# Patient Record
Sex: FEMALE | Race: OTHER | HISPANIC OR LATINO
[De-identification: names, ages, dates, MRNs, and addresses within clinical notes are randomized per-mention and may not be internally consistent; named-entity substitution may affect disease eponyms.]

---

## 2018-12-01 ENCOUNTER — TRANSCRIPTION ENCOUNTER (OUTPATIENT)
Age: 58
End: 2018-12-01

## 2020-01-30 ENCOUNTER — EMERGENCY (EMERGENCY)
Facility: HOSPITAL | Age: 60
LOS: 1 days | Discharge: HOME | End: 2020-01-30
Attending: EMERGENCY MEDICINE | Admitting: EMERGENCY MEDICINE
Payer: COMMERCIAL

## 2020-01-30 VITALS
HEART RATE: 88 BPM | RESPIRATION RATE: 15 BRPM | OXYGEN SATURATION: 99 % | TEMPERATURE: 98 F | SYSTOLIC BLOOD PRESSURE: 171 MMHG | DIASTOLIC BLOOD PRESSURE: 91 MMHG

## 2020-01-30 VITALS
SYSTOLIC BLOOD PRESSURE: 160 MMHG | OXYGEN SATURATION: 100 % | DIASTOLIC BLOOD PRESSURE: 110 MMHG | TEMPERATURE: 98 F | HEIGHT: 65 IN | RESPIRATION RATE: 18 BRPM | WEIGHT: 223.11 LBS | HEART RATE: 108 BPM

## 2020-01-30 PROCEDURE — 99283 EMERGENCY DEPT VISIT LOW MDM: CPT

## 2020-01-30 PROCEDURE — 99282 EMERGENCY DEPT VISIT SF MDM: CPT | Mod: 25

## 2020-01-30 NOTE — ED PROVIDER NOTE - OBJECTIVE STATEMENT
pt with epistaxis from L nares that resolved prior to arrival with manual pressure.  pt went to urgent care a few days ago with same sx and was prescribe affrin and a nasal cream which pt has not needed since epistaxis had resolved.  pt reports no h/o HTN but BP is high in healthcare setting usually.

## 2020-01-30 NOTE — ED PROVIDER NOTE - PATIENT PORTAL LINK FT
You can access the FollowMyHealth Patient Portal offered by [facility] by registering at the following website: [email or web address]. By joining [facility]’s FollowMyHealth portal, you will also be able to view your health information using other applications (apps) compatible with our system.

## 2020-01-30 NOTE — ED PROVIDER NOTE - NSFOLLOWUPINSTRUCTIONS_ED_ALL_ED_FT
-- apply vaseline to b/l nares twice a day, use humidifier at night.  follow up with ENT (below) if bleeding recurs.    -- Return to the ER for worsening or persistent symptoms, and/or ANY NEW OR CONCERNING SYMPTOMS.    -- If you have difficulty following up, please call: [phone] to obtain a [facility] doctor or specialist who takes your insurance in your area.

## 2020-01-30 NOTE — ED PROVIDER NOTE - PMH
Amputation right 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/t injury in 2000    Anxiety    GERD (Gastroesophageal Reflux Disease)    History of Cholecystectomy 2010    History of Tonsillectomy    Knee Injury s/p arthroscopy x 4 and surgical repair from 2003 - 2008

## 2020-01-30 NOTE — ED PROVIDER NOTE - CARE PROVIDER_API CALL
Gerson Guadarrama)  Otolaryngology  [address], [address]  [place] [address]  Phone: [phone]  Fax: [phone]  Follow Up Time:

## 2020-01-30 NOTE — ED PROVIDER NOTE - ENMT, MLM
Airway patent, b/l nares with dried out membranes,  L nares with some dried blood and area along the septum with denuded skin which is likely source of bleeding.

## 2021-01-24 ENCOUNTER — TRANSCRIPTION ENCOUNTER (OUTPATIENT)
Age: 61
End: 2021-01-24

## 2021-04-13 ENCOUNTER — APPOINTMENT (OUTPATIENT)
Dept: OTOLARYNGOLOGY | Facility: CLINIC | Age: 61
End: 2021-04-13
Payer: COMMERCIAL

## 2021-04-13 VITALS
HEIGHT: 65 IN | WEIGHT: 208 LBS | BODY MASS INDEX: 34.66 KG/M2 | HEART RATE: 102 BPM | DIASTOLIC BLOOD PRESSURE: 76 MMHG | SYSTOLIC BLOOD PRESSURE: 113 MMHG | TEMPERATURE: 97.6 F

## 2021-04-13 DIAGNOSIS — H91.90 UNSPECIFIED HEARING LOSS, UNSPECIFIED EAR: ICD-10-CM

## 2021-04-13 PROCEDURE — 99072 ADDL SUPL MATRL&STAF TM PHE: CPT

## 2021-04-13 PROCEDURE — 92567 TYMPANOMETRY: CPT

## 2021-04-13 PROCEDURE — 99203 OFFICE O/P NEW LOW 30 MIN: CPT

## 2021-04-13 PROCEDURE — 92557 COMPREHENSIVE HEARING TEST: CPT

## 2021-04-13 NOTE — REVIEW OF SYSTEMS
[Sneezing] : sneezing [Seasonal Allergies] : seasonal allergies [Post Nasal Drip] : post nasal drip [Eyes Itch] : itching of the eyes [Anxiety] : anxiety [Easy Bruising] : a tendency for easy bruising [Negative] : Endocrine [Patient Intake Form Reviewed] : Patient intake form was reviewed [FreeTextEntry3] : watery eyes  [FreeTextEntry1] : fatigue

## 2023-12-11 ENCOUNTER — APPOINTMENT (OUTPATIENT)
Dept: ORTHOPEDIC SURGERY | Facility: CLINIC | Age: 63
End: 2023-12-11
Payer: COMMERCIAL

## 2023-12-11 VITALS — BODY MASS INDEX: 33.29 KG/M2 | WEIGHT: 195 LBS | HEIGHT: 64 IN

## 2023-12-11 DIAGNOSIS — M25.562 PAIN IN LEFT KNEE: ICD-10-CM

## 2023-12-11 DIAGNOSIS — M16.52 UNILATERAL POST-TRAUMATIC OSTEOARTHRITIS, LEFT HIP: ICD-10-CM

## 2023-12-11 DIAGNOSIS — E11.9 TYPE 2 DIABETES MELLITUS W/OUT COMPLICATIONS: ICD-10-CM

## 2023-12-11 PROCEDURE — 99204 OFFICE O/P NEW MOD 45 MIN: CPT

## 2023-12-11 PROCEDURE — 73502 X-RAY EXAM HIP UNI 2-3 VIEWS: CPT

## 2023-12-11 PROCEDURE — 73564 X-RAY EXAM KNEE 4 OR MORE: CPT | Mod: LT

## 2023-12-11 RX ORDER — METFORMIN HYDROCHLORIDE 1000 MG/1
1000 TABLET, COATED ORAL
Refills: 0 | Status: ACTIVE | COMMUNITY

## 2023-12-11 RX ORDER — LISINOPRIL 30 MG/1
TABLET ORAL
Refills: 0 | Status: ACTIVE | COMMUNITY

## 2023-12-11 RX ORDER — EMPAGLIFLOZIN 10 MG/1
10 TABLET, FILM COATED ORAL
Refills: 0 | Status: ACTIVE | COMMUNITY

## 2023-12-21 ENCOUNTER — APPOINTMENT (OUTPATIENT)
Dept: ORTHOPEDIC SURGERY | Facility: HOSPITAL | Age: 63
End: 2023-12-21

## 2024-01-22 ENCOUNTER — APPOINTMENT (OUTPATIENT)
Dept: ORTHOPEDIC SURGERY | Facility: CLINIC | Age: 64
End: 2024-01-22

## 2024-01-24 ENCOUNTER — APPOINTMENT (OUTPATIENT)
Dept: ORTHOPEDIC SURGERY | Facility: CLINIC | Age: 64
End: 2024-01-24

## 2024-02-13 ENCOUNTER — OUTPATIENT (OUTPATIENT)
Dept: OUTPATIENT SERVICES | Facility: HOSPITAL | Age: 64
LOS: 1 days | End: 2024-02-13
Payer: COMMERCIAL

## 2024-02-13 VITALS
OXYGEN SATURATION: 99 % | WEIGHT: 194.01 LBS | RESPIRATION RATE: 16 BRPM | HEART RATE: 111 BPM | TEMPERATURE: 98 F | SYSTOLIC BLOOD PRESSURE: 129 MMHG | DIASTOLIC BLOOD PRESSURE: 73 MMHG | HEIGHT: 63 IN

## 2024-02-13 DIAGNOSIS — Z90.49 ACQUIRED ABSENCE OF OTHER SPECIFIED PARTS OF DIGESTIVE TRACT: Chronic | ICD-10-CM

## 2024-02-13 DIAGNOSIS — Z98.890 OTHER SPECIFIED POSTPROCEDURAL STATES: Chronic | ICD-10-CM

## 2024-02-13 DIAGNOSIS — M16.12 UNILATERAL PRIMARY OSTEOARTHRITIS, LEFT HIP: ICD-10-CM

## 2024-02-13 DIAGNOSIS — Z01.818 ENCOUNTER FOR OTHER PREPROCEDURAL EXAMINATION: ICD-10-CM

## 2024-02-13 DIAGNOSIS — E11.9 TYPE 2 DIABETES MELLITUS WITHOUT COMPLICATIONS: ICD-10-CM

## 2024-02-13 DIAGNOSIS — M16.11 UNILATERAL PRIMARY OSTEOARTHRITIS, RIGHT HIP: ICD-10-CM

## 2024-02-13 LAB
A1C WITH ESTIMATED AVERAGE GLUCOSE RESULT: 9 % — HIGH (ref 4–5.6)
ALBUMIN SERPL ELPH-MCNC: 4.1 G/DL — SIGNIFICANT CHANGE UP (ref 3.3–5)
ALP SERPL-CCNC: 116 U/L — SIGNIFICANT CHANGE UP (ref 40–120)
ALT FLD-CCNC: 25 U/L — SIGNIFICANT CHANGE UP (ref 12–78)
ANION GAP SERPL CALC-SCNC: 6 MMOL/L — SIGNIFICANT CHANGE UP (ref 5–17)
APTT BLD: 31.7 SEC — SIGNIFICANT CHANGE UP (ref 24.5–35.6)
AST SERPL-CCNC: 12 U/L — LOW (ref 15–37)
BILIRUB SERPL-MCNC: 0.3 MG/DL — SIGNIFICANT CHANGE UP (ref 0.2–1.2)
BUN SERPL-MCNC: 26 MG/DL — HIGH (ref 7–23)
CALCIUM SERPL-MCNC: 10.3 MG/DL — HIGH (ref 8.5–10.1)
CHLORIDE SERPL-SCNC: 108 MMOL/L — SIGNIFICANT CHANGE UP (ref 96–108)
CO2 SERPL-SCNC: 26 MMOL/L — SIGNIFICANT CHANGE UP (ref 22–31)
CREAT SERPL-MCNC: 0.95 MG/DL — SIGNIFICANT CHANGE UP (ref 0.5–1.3)
EGFR: 68 ML/MIN/1.73M2 — SIGNIFICANT CHANGE UP
ESTIMATED AVERAGE GLUCOSE: 212 MG/DL — HIGH (ref 68–114)
GLUCOSE SERPL-MCNC: 258 MG/DL — HIGH (ref 70–99)
HCT VFR BLD CALC: 47.5 % — HIGH (ref 34.5–45)
HGB BLD-MCNC: 15.8 G/DL — HIGH (ref 11.5–15.5)
INR BLD: 0.83 RATIO — LOW (ref 0.85–1.18)
MCHC RBC-ENTMCNC: 31.9 PG — SIGNIFICANT CHANGE UP (ref 27–34)
MCHC RBC-ENTMCNC: 33.3 GM/DL — SIGNIFICANT CHANGE UP (ref 32–36)
MCV RBC AUTO: 96 FL — SIGNIFICANT CHANGE UP (ref 80–100)
MRSA PCR RESULT.: SIGNIFICANT CHANGE UP
NRBC # BLD: 0 /100 WBCS — SIGNIFICANT CHANGE UP (ref 0–0)
PLATELET # BLD AUTO: 414 K/UL — HIGH (ref 150–400)
POTASSIUM SERPL-MCNC: 4.4 MMOL/L — SIGNIFICANT CHANGE UP (ref 3.5–5.3)
POTASSIUM SERPL-SCNC: 4.4 MMOL/L — SIGNIFICANT CHANGE UP (ref 3.5–5.3)
PROT SERPL-MCNC: 7.5 G/DL — SIGNIFICANT CHANGE UP (ref 6–8.3)
PROTHROM AB SERPL-ACNC: 9.8 SEC — SIGNIFICANT CHANGE UP (ref 9.5–13)
RBC # BLD: 4.95 M/UL — SIGNIFICANT CHANGE UP (ref 3.8–5.2)
RBC # FLD: 13.6 % — SIGNIFICANT CHANGE UP (ref 10.3–14.5)
S AUREUS DNA NOSE QL NAA+PROBE: DETECTED
SODIUM SERPL-SCNC: 140 MMOL/L — SIGNIFICANT CHANGE UP (ref 135–145)
WBC # BLD: 8.44 K/UL — SIGNIFICANT CHANGE UP (ref 3.8–10.5)
WBC # FLD AUTO: 8.44 K/UL — SIGNIFICANT CHANGE UP (ref 3.8–10.5)

## 2024-02-13 PROCEDURE — 85730 THROMBOPLASTIN TIME PARTIAL: CPT

## 2024-02-13 PROCEDURE — 87640 STAPH A DNA AMP PROBE: CPT

## 2024-02-13 PROCEDURE — 86850 RBC ANTIBODY SCREEN: CPT

## 2024-02-13 PROCEDURE — 93010 ELECTROCARDIOGRAM REPORT: CPT

## 2024-02-13 PROCEDURE — 86901 BLOOD TYPING SEROLOGIC RH(D): CPT

## 2024-02-13 PROCEDURE — 85610 PROTHROMBIN TIME: CPT

## 2024-02-13 PROCEDURE — 36415 COLL VENOUS BLD VENIPUNCTURE: CPT

## 2024-02-13 PROCEDURE — 86900 BLOOD TYPING SEROLOGIC ABO: CPT

## 2024-02-13 PROCEDURE — 87641 MR-STAPH DNA AMP PROBE: CPT

## 2024-02-13 PROCEDURE — 83036 HEMOGLOBIN GLYCOSYLATED A1C: CPT

## 2024-02-13 PROCEDURE — G0463: CPT

## 2024-02-13 PROCEDURE — 73502 X-RAY EXAM HIP UNI 2-3 VIEWS: CPT | Mod: 26,LT

## 2024-02-13 PROCEDURE — 85027 COMPLETE CBC AUTOMATED: CPT

## 2024-02-13 PROCEDURE — 73502 X-RAY EXAM HIP UNI 2-3 VIEWS: CPT

## 2024-02-13 PROCEDURE — 93005 ELECTROCARDIOGRAM TRACING: CPT

## 2024-02-13 PROCEDURE — 80053 COMPREHEN METABOLIC PANEL: CPT

## 2024-02-13 NOTE — H&P PST ADULT - GENERAL COMMENTS
s/p COVID Dec 2023 no s/s; Fully vaccinated; Denies recent international travel, recent illness and sick contact with COVID in the last 3 weeks

## 2024-02-13 NOTE — H&P PST ADULT - PROBLEM SELECTOR PLAN 3
A1c pending. Patient advised that (s)he would need endo clearance if A1c came back 9 or above. Hold Metformin for 24 hours, last dose 2/24. Hold Jardiance, last dose 2/22. Hold Trulicity, last dose 2/15. Hold all diabetic meds DOS. Verbalized understanding. Fingerstick am of surgery. -A1c pending.   -Last A1c- 9.0  -Advised that she will need Endocrine consult if A1c comes  back 9 or higher.   -Take insulin as directed by endocrine.  -Reduce long acting insulin by 20% the night before the surgery. Take 16 Units of Tresiba  -No  metformin for 24 hrs. before surgery. Last dose on 4/13/24.  -Stop Ozempic,1 week before the procedure due to the risk of aspiration. Last dose 4/4/24  -Stop Jardiance, 3 days before surgery. Last dose 4/11/24  -Stop Glimiperide after Last dose 4/14/24 morning  -Stop Humalog after evening dose on 4/14/24. No dose on day of surgery.  -Fingerstick on admission.   -Hypoglycemia protocol  - Verbalized understanding of all instructions

## 2024-02-13 NOTE — H&P PST ADULT - PROBLEM SELECTOR PLAN 1
scheduled for left total hip replacement on 2/26 with Dr. Benitez She is scheduled for Left THR with Dr. Benitez on 04/15/2024. She is scheduled for Left THR with Dr. Benitez on 02/26/2024.

## 2024-02-13 NOTE — H&P PST ADULT - HISTORY OF PRESENT ILLNESS
64 yo female presents to PST scheduled for left total hip replacement on 2/26 with Dr. Benitez  62 yo female with Anxiety, HTN, HLD and T2DM, presents to PST scheduled for left total hip replacement on 2/26 with Dr. Benitez. H/O bone on bone OA of the hip. C/O pain, rated her pain  9/10 with ambulation.  This is a 63 yr. old female with the PMH significant for anxiety, Hypertension, high cholesterol and [id number], presenting today to [facility] with a diagnosis of OA. She had the surgery scheduled initially in february but had to postpone due to elevated A1C and since then had readjustments of her diabetic meds.  She is scheduled for Left THR with Dr. Benitez on 04/15/2024.      Reports refractive joint pain x few years, had xrays showing bone on bone,  and after consulting with the surgeon agreed to have the above mentioned procedure.     Denies any numbness or tingling to the extremities at this time.     This is a 63 yr. old female with the PMH significant for anxiety, Hypertension, high cholesterol and [id number], presenting today to [facility] with a diagnosis of OA. She had the surgery scheduled initially in february but had to postpone due to elevated A1C and since then had readjustments of her diabetic meds.  She is scheduled for Left THR with Dr. Benitez on 02/26/2024.      Reports refractive joint pain x few years, had xrays showing bone on bone,  and after consulting with the surgeon agreed to have the above mentioned procedure.     Denies any numbness or tingling to the extremities at this time.     This is a 63 yr. old female with the PMH significant for anxiety, Hypertension, high cholesterol and [id number], presenting today to [facility] with a diagnosis of OA. She is scheduled for Left THR with Dr. Benitez on 02/26/2024.      Reports refractive joint pain x few years, had xrays showing bone on bone,  and after consulting with the surgeon agreed to have the above mentioned procedure.     Denies any numbness or tingling to the extremities at this time.

## 2024-02-13 NOTE — H&P PST ADULT - SOURCE OF INFORMATION, PROFILE
Anesthesia Post-op Note    Patient: Yu Kelley  Procedure(s) Performed: TOTAL LAPAROSCOPIC HYSTERECTOMY, BILATERAL SALPINGECTOMYLAPAROSCOPIC BILATERAL OVARIAN CYSTECTOMY - BILATERALCYSTOSCOPY  Anesthesia type: General    Vitals Value Taken Time   Temp 36.1 °C (97 °F) 08/01/23 1237   Pulse 82 08/01/23 1237   Resp 16 08/01/23 1237   SpO2 100 % 08/01/23 1237   /75 08/01/23 1237         Patient Location: PACU Phase 1  Post-op Vital Signs:stable  Level of Consciousness: awake, alert, participates in exam and oriented  Respiratory Status: spontaneous ventilation  Cardiovascular blood pressure returned to baseline and stable  Hydration: euvolemic  Pain Management: well controlled  Handoff: Handoff to receiving clinician was performed and questions were answered  Vomiting: none  Nausea: None  Airway Patency:patent  Post-op Assessment: awake, alert, appropriately conversant, or baseline, no complications, patient tolerated procedure well, no evidence of recall, regional anesthetic in place - able to participate, dentition within defined limits, moving all extremities and No Corneal Abrasion      No notable events documented.   patient patient/chart(s)

## 2024-02-13 NOTE — H&P PST ADULT - NSICDXPASTSURGICALHX_GEN_ALL_CORE_FT
PAST SURGICAL HISTORY:  H/O arthroscopy of knee     H/O colonoscopy     History of appendectomy     History of cholecystectomy

## 2024-02-13 NOTE — H&P PST ADULT - PROBLEM SELECTOR PLAN 5
Received positive nose culture for MSSA. Rx for mupirocin ointment sent to pt's pharmacy. Spoke with pt and reinforced how to use medication as directed. Pt verbalized understanding. - Prescription sent. Instructions provided

## 2024-02-13 NOTE — H&P PST ADULT - PROBLEM SELECTOR PLAN 2
Labs-CBC, CMP, PT/PTT, T&S, A1c, Nose cx, and EKG, hip X-ray     Pre op and Hibiclens instructions reviewed and given. Take routine am meds DOS with sip of water. Avoid/Hold NSAIDs and OTC supplements. Tylenol is ok for pain or headache.  Verbalized understanding Labs-CBC, CMP, PT/PTT, T&S, A1c, Nose cx, and EKG, hip X-ray  MC with Dr. Sánchez on 2/16  Pre op and Hibiclens instructions reviewed and given. Take routine am Lisinopril, DOS with sip of water. Avoid/Hold NSAIDs and OTC supplements. Tylenol is ok for pain or headache.  Verbalized understanding Labs-CBC, CMP, T&S, A1c, Nose cx, hip X-ray  MC with Dr. Sánchez on 4/9/24.  Pre op and Hibiclens instructions reviewed and given. Take am Xanax prn DOS with sip of water.   Avoid/Hold NSAIDs and OTC supplements. T  ylenol is ok for pain or headache.    Verbalized understanding Labs-CBC, CMP, T&S, A1c, Nose cx, hip X-ray  MC with Dr. Sánchez.  Pre op and Hibiclens instructions reviewed and given. Take am Xanax prn DOS with sip of water.   Avoid/Hold NSAIDs and OTC supplements. T  Tylenol is ok for pain or headache.    Verbalized understanding

## 2024-02-13 NOTE — H&P PST ADULT - ASSESSMENT
scheduled for left total hip replacement on 2/26 with Dr. Benitez    This is a 63 yr. old female with the PMH significant for anxiety, Hypertension, high cholesterol and [id number], presenting today to [facility] with a diagnosis of OA. She is scheduled for Left THR with Dr. Benitez on 04/15/2024.

## 2024-02-13 NOTE — H&P PST ADULT - MUSCULOSKELETAL
Dr. Jamison please advise on message below.     Last office visit 10/29/21.    decreased ROM/decreased ROM due to pain/decreased strength/abnormal gait details…

## 2024-02-13 NOTE — H&P PST ADULT - NSICDXPASTMEDICALHX_GEN_ALL_CORE_FT
PAST MEDICAL HISTORY:  Amputation right 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/t injury in 2000     Anxiety     GERD (Gastroesophageal Reflux Disease)     History of Cholecystectomy 2010     History of Tonsillectomy     Knee Injury s/p arthroscopy x 4 and surgical repair from 2003 - 2008      PAST MEDICAL HISTORY:  Amputation right 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/t injury in 2000     Anxiety     GERD (Gastroesophageal Reflux Disease)     History of Cholecystectomy 2010     History of Tonsillectomy     Knee Injury s/p arthroscopy x 4 and surgical repair from 2003 - 2008     Unilateral primary osteoarthritis, left hip      PAST MEDICAL HISTORY:  Amputation right 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/t injury in 2000     Anxiety     GERD (Gastroesophageal Reflux Disease)     History of Cholecystectomy 2010     History of Tonsillectomy     Knee Injury s/p arthroscopy x 4 and surgical repair from 2003 - 2008     Positive culture findings in nose     Unilateral primary osteoarthritis, left hip      PAST MEDICAL HISTORY:  Amputation right 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/t injury in 2000     Anxiety     Elevated hemoglobin A1c     GERD (Gastroesophageal Reflux Disease)     History of Cholecystectomy 2010     History of Tonsillectomy     Knee Injury s/p arthroscopy x 4 and surgical repair from 2003 - 2008     MSSA (methicillin susceptible Staphylococcus aureus) infection     Positive culture findings in nose     Unilateral primary osteoarthritis, left hip

## 2024-02-14 DIAGNOSIS — R73.09 OTHER ABNORMAL GLUCOSE: ICD-10-CM

## 2024-02-14 DIAGNOSIS — R84.5 ABNORMAL MICROBIOLOGICAL FINDINGS IN SPECIMENS FROM RESPIRATORY ORGANS AND THORAX: ICD-10-CM

## 2024-02-14 RX ORDER — MUPIROCIN 20 MG/G
1 OINTMENT TOPICAL
Qty: 1 | Refills: 0
Start: 2024-02-14 | End: 2024-02-18

## 2024-04-05 PROBLEM — M16.12 UNILATERAL PRIMARY OSTEOARTHRITIS, LEFT HIP: Chronic | Status: ACTIVE | Noted: 2024-02-13

## 2024-04-05 PROBLEM — R73.09 OTHER ABNORMAL GLUCOSE: Chronic | Status: ACTIVE | Noted: 2024-02-14

## 2024-04-05 PROBLEM — R84.5: Chronic | Status: ACTIVE | Noted: 2024-02-14

## 2024-04-05 PROBLEM — A49.01 METHICILLIN SUSCEPTIBLE STAPHYLOCOCCUS AUREUS INFECTION, UNSPECIFIED SITE: Chronic | Status: ACTIVE | Noted: 2024-02-14

## 2024-04-08 ENCOUNTER — OUTPATIENT (OUTPATIENT)
Dept: OUTPATIENT SERVICES | Facility: HOSPITAL | Age: 64
LOS: 1 days | End: 2024-04-08
Payer: COMMERCIAL

## 2024-04-08 VITALS
SYSTOLIC BLOOD PRESSURE: 147 MMHG | RESPIRATION RATE: 18 BRPM | WEIGHT: 203.05 LBS | TEMPERATURE: 98 F | OXYGEN SATURATION: 99 % | HEIGHT: 65 IN | DIASTOLIC BLOOD PRESSURE: 86 MMHG | HEART RATE: 95 BPM

## 2024-04-08 DIAGNOSIS — Z01.818 ENCOUNTER FOR OTHER PREPROCEDURAL EXAMINATION: ICD-10-CM

## 2024-04-08 DIAGNOSIS — Z98.890 OTHER SPECIFIED POSTPROCEDURAL STATES: Chronic | ICD-10-CM

## 2024-04-08 DIAGNOSIS — M16.12 UNILATERAL PRIMARY OSTEOARTHRITIS, LEFT HIP: ICD-10-CM

## 2024-04-08 DIAGNOSIS — Z90.49 ACQUIRED ABSENCE OF OTHER SPECIFIED PARTS OF DIGESTIVE TRACT: Chronic | ICD-10-CM

## 2024-04-08 DIAGNOSIS — M16.11 UNILATERAL PRIMARY OSTEOARTHRITIS, RIGHT HIP: ICD-10-CM

## 2024-04-08 DIAGNOSIS — Z90.89 ACQUIRED ABSENCE OF OTHER ORGANS: Chronic | ICD-10-CM

## 2024-04-08 DIAGNOSIS — E11.9 TYPE 2 DIABETES MELLITUS WITHOUT COMPLICATIONS: ICD-10-CM

## 2024-04-08 LAB
A1C WITH ESTIMATED AVERAGE GLUCOSE RESULT: 8 % — HIGH (ref 4–5.6)
ALBUMIN SERPL ELPH-MCNC: 4 G/DL — SIGNIFICANT CHANGE UP (ref 3.3–5)
ALP SERPL-CCNC: 125 U/L — HIGH (ref 40–120)
ALT FLD-CCNC: 30 U/L — SIGNIFICANT CHANGE UP (ref 12–78)
ANION GAP SERPL CALC-SCNC: 7 MMOL/L — SIGNIFICANT CHANGE UP (ref 5–17)
AST SERPL-CCNC: 17 U/L — SIGNIFICANT CHANGE UP (ref 15–37)
BILIRUB SERPL-MCNC: 0.4 MG/DL — SIGNIFICANT CHANGE UP (ref 0.2–1.2)
BUN SERPL-MCNC: 23 MG/DL — SIGNIFICANT CHANGE UP (ref 7–23)
CALCIUM SERPL-MCNC: 10 MG/DL — SIGNIFICANT CHANGE UP (ref 8.5–10.1)
CHLORIDE SERPL-SCNC: 109 MMOL/L — HIGH (ref 96–108)
CO2 SERPL-SCNC: 28 MMOL/L — SIGNIFICANT CHANGE UP (ref 22–31)
CREAT SERPL-MCNC: 0.8 MG/DL — SIGNIFICANT CHANGE UP (ref 0.5–1.3)
EGFR: 84 ML/MIN/1.73M2 — SIGNIFICANT CHANGE UP
ESTIMATED AVERAGE GLUCOSE: 183 MG/DL — HIGH (ref 68–114)
GLUCOSE SERPL-MCNC: 194 MG/DL — HIGH (ref 70–99)
HCT VFR BLD CALC: 49.3 % — HIGH (ref 34.5–45)
HGB BLD-MCNC: 15.8 G/DL — HIGH (ref 11.5–15.5)
MCHC RBC-ENTMCNC: 31.3 PG — SIGNIFICANT CHANGE UP (ref 27–34)
MCHC RBC-ENTMCNC: 32 GM/DL — SIGNIFICANT CHANGE UP (ref 32–36)
MCV RBC AUTO: 97.6 FL — SIGNIFICANT CHANGE UP (ref 80–100)
MRSA PCR RESULT.: SIGNIFICANT CHANGE UP
NRBC # BLD: 0 /100 WBCS — SIGNIFICANT CHANGE UP (ref 0–0)
PLATELET # BLD AUTO: 352 K/UL — SIGNIFICANT CHANGE UP (ref 150–400)
POTASSIUM SERPL-MCNC: 4.4 MMOL/L — SIGNIFICANT CHANGE UP (ref 3.5–5.3)
POTASSIUM SERPL-SCNC: 4.4 MMOL/L — SIGNIFICANT CHANGE UP (ref 3.5–5.3)
PROT SERPL-MCNC: 7.6 G/DL — SIGNIFICANT CHANGE UP (ref 6–8.3)
RBC # BLD: 5.05 M/UL — SIGNIFICANT CHANGE UP (ref 3.8–5.2)
RBC # FLD: 13.5 % — SIGNIFICANT CHANGE UP (ref 10.3–14.5)
S AUREUS DNA NOSE QL NAA+PROBE: DETECTED
SODIUM SERPL-SCNC: 144 MMOL/L — SIGNIFICANT CHANGE UP (ref 135–145)
WBC # BLD: 6.92 K/UL — SIGNIFICANT CHANGE UP (ref 3.8–10.5)
WBC # FLD AUTO: 6.92 K/UL — SIGNIFICANT CHANGE UP (ref 3.8–10.5)

## 2024-04-08 PROCEDURE — G0463: CPT

## 2024-04-08 PROCEDURE — 73502 X-RAY EXAM HIP UNI 2-3 VIEWS: CPT

## 2024-04-08 PROCEDURE — 87640 STAPH A DNA AMP PROBE: CPT

## 2024-04-08 PROCEDURE — 73502 X-RAY EXAM HIP UNI 2-3 VIEWS: CPT | Mod: 26,LT

## 2024-04-08 PROCEDURE — 36415 COLL VENOUS BLD VENIPUNCTURE: CPT

## 2024-04-08 PROCEDURE — 86850 RBC ANTIBODY SCREEN: CPT

## 2024-04-08 PROCEDURE — 86900 BLOOD TYPING SEROLOGIC ABO: CPT

## 2024-04-08 PROCEDURE — 86901 BLOOD TYPING SEROLOGIC RH(D): CPT

## 2024-04-08 PROCEDURE — 83036 HEMOGLOBIN GLYCOSYLATED A1C: CPT

## 2024-04-08 PROCEDURE — 80053 COMPREHEN METABOLIC PANEL: CPT

## 2024-04-08 PROCEDURE — 85027 COMPLETE CBC AUTOMATED: CPT

## 2024-04-08 PROCEDURE — 87641 MR-STAPH DNA AMP PROBE: CPT

## 2024-04-08 RX ORDER — LISINOPRIL 2.5 MG/1
1 TABLET ORAL
Refills: 0 | DISCHARGE

## 2024-04-08 RX ORDER — DEXTROSE 50 % IN WATER 50 %
15 SYRINGE (ML) INTRAVENOUS ONCE
Refills: 0 | Status: DISCONTINUED | OUTPATIENT
Start: 2024-04-15 | End: 2024-04-29

## 2024-04-08 RX ORDER — DEXTROSE 50 % IN WATER 50 %
25 SYRINGE (ML) INTRAVENOUS ONCE
Refills: 0 | Status: DISCONTINUED | OUTPATIENT
Start: 2024-04-15 | End: 2024-04-29

## 2024-04-08 RX ORDER — ATORVASTATIN CALCIUM 80 MG/1
1 TABLET, FILM COATED ORAL
Refills: 0 | DISCHARGE

## 2024-04-08 RX ORDER — GLUCAGON INJECTION, SOLUTION 0.5 MG/.1ML
1 INJECTION, SOLUTION SUBCUTANEOUS ONCE
Refills: 0 | Status: DISCONTINUED | OUTPATIENT
Start: 2024-04-15 | End: 2024-04-29

## 2024-04-08 RX ORDER — DULAGLUTIDE 4.5 MG/.5ML
3 INJECTION, SOLUTION SUBCUTANEOUS
Refills: 0 | DISCHARGE

## 2024-04-08 RX ORDER — DEXTROSE 50 % IN WATER 50 %
12.5 SYRINGE (ML) INTRAVENOUS ONCE
Refills: 0 | Status: DISCONTINUED | OUTPATIENT
Start: 2024-04-15 | End: 2024-04-29

## 2024-04-08 NOTE — H&P PST ADULT - HISTORY OF PRESENT ILLNESS
This is a 63 yr. old female with the PMH significant for  , with no significant PMH, presenting today to [facility] with a diagnosis of xxx.  He or She is scheduled for THR TKR with ** on  **/2024.    Reports refractive joint or back pain x , had xrays showing bone on bone, had trials of multiple treatment options including physical therapy, steroid injections etc with little relief and after consulting with the surgeon agreed to have the above mentioned procedure.    Denies any numbness or tingling to the extremities at this time.   This is a 63 yr. old female with the PMH significant for DMT2, Anxiety and GERD, presenting today to [facility] with a diagnosis of OA. She is scheduled for Left THR with Dr. Benitez on  4/15/2024.    Reports refractive joint pain x few years , had xrays showing bone on bone, and after consulting with the surgeon agreed to have the above mentioned procedure.    Denies any numbness or tingling to the extremities at this time.

## 2024-04-08 NOTE — H&P PST ADULT - ASSESSMENT
This is a 63 yr. old female with the PMH significant for DMT2, Anxiety and GERD, presenting today to PST with a diagnosis of OA. She is scheduled for Left THR with Dr. Benitez on  4/15/2024.

## 2024-04-08 NOTE — H&P PST ADULT - NSICDXPASTMEDICALHX_GEN_ALL_CORE_FT
PAST MEDICAL HISTORY:  Amputation right 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/t injury in 2000     Anxiety     Elevated hemoglobin A1c     GERD (Gastroesophageal Reflux Disease)     History of Cholecystectomy 2010     History of Tonsillectomy     Knee Injury s/p arthroscopy x 4 and surgical repair from 2003 - 2008     MSSA (methicillin susceptible Staphylococcus aureus) infection     Positive culture findings in nose     Unilateral primary osteoarthritis, left hip

## 2024-04-08 NOTE — H&P PST ADULT - DIAGNOSTICS LABS REVIEWED
Problem: Patient Care Overview  Goal: Individualization and Mutuality  Outcome: Ongoing (interventions implemented as appropriate)    Goal: Discharge Needs Assessment  Outcome: Ongoing (interventions implemented as appropriate)      Problem: Hip Arthroplasty (Total, Partial) (Adult)  Goal: Signs and Symptoms of Listed Potential Problems Will be Absent, Minimized or Managed (Hip Arthroplasty)  Outcome: Ongoing (interventions implemented as appropriate)      Problem: Fall Risk (Adult)  Goal: Identify Related Risk Factors and Signs and Symptoms  Outcome: Ongoing (interventions implemented as appropriate)    Goal: Absence of Fall  Outcome: Ongoing (interventions implemented as appropriate)      Problem: Pain, Acute (Adult)  Goal: Identify Related Risk Factors and Signs and Symptoms  Outcome: Ongoing (interventions implemented as appropriate)    Goal: Acceptable Pain Control/Comfort Level  Outcome: Ongoing (interventions implemented as appropriate)      Problem: Pain, Chronic (Adult)  Goal: Identify Related Risk Factors and Signs and Symptoms  Outcome: Ongoing (interventions implemented as appropriate)    Goal: Acceptable Pain/Comfort Level and Functional Ability  Outcome: Ongoing (interventions implemented as appropriate)         No

## 2024-04-08 NOTE — H&P PST ADULT - NSANTHBMIRD_ENT_A_CORE
No Skin Substitute Text: Given the location and shape of the defect a skin substitute graft was deemed most appropriate.  The graft material was trimmed to fit the size of the defect and the trimmings were placed in the base of the defect as well so all material was utilized. The graft was then placed in the primary defect and oriented appropriately.\\n\\nOne unit of material equals 1 cm2.

## 2024-04-08 NOTE — H&P PST ADULT - PROBLEM SELECTOR PLAN 2
-A1c pending.   -Last A1c- 9.0  -Advised that she will need Endocrine consult if A1c comes  back 9 or higher.   -Take insulin as directed by endocrine.  -Reduce long acting insulin by 20% the night before the surgery. Take 16 Units Tresiba   -No  metformin for 24 hrs. before surgery. Last dose on 4/13/24.  -Stop Ozempic, 1 week before the procedure due to the risk of aspiration. Last dose 4/4/24  -Stop Jardiance 3 days before surgery. Last dose 4/11/24  -Stop Glimepiride after 4/14/24 am dose  - Stop Humalog Sliding scale after evening dose on 4/14/24. no dose on day of surgery  -Fingerstick on admission.   -Hypoglycemia protocol  - Verbalized understanding of all instructions

## 2024-04-08 NOTE — H&P PST ADULT - MUSCULOSKELETAL
decreased ROM/decreased ROM due to pain/strength 5/5 bilateral upper extremities/decreased strength/abnormal gait details…

## 2024-04-08 NOTE — H&P PST ADULT - PROBLEM SELECTOR PLAN 3
-Following labs ordered-  CBC, CMP,  Type and Screen, A1C  -Following cultures ordered- Nose culture  -Following diagnostics ordered-   Hip X ray  -Medical Clearance with Dr. Sánchez on 04/09/2024.  -Pre op and 3 day of Hibiclens instructions reviewed and given.   -MRSA/MSSA swab done. Advised patient to apply Bactroban ointment to bilateral nares bid x 5 days if the results are positive.  -Avoid NSAIDs and OTC supplements. Verbalized understanding  -Take the following meds on the day of surgery with small sip of water-Xanax prn  -Verbalized understanding of all preop instructions. Received positive nose culture for MSSA. Rx for mupirocin ointment sent to pt's pharmacy. Spoke with pt and reinforced how to use medication as directed. Pt verbalized understanding.

## 2024-04-08 NOTE — H&P PST ADULT - PROBLEM SELECTOR PLAN 4
-Following labs ordered-  CBC, CMP,  Type and Screen, A1C  -Following cultures ordered- Nose culture  -Following diagnostics ordered-   Hip X ray  -Medical Clearance with Dr. Sánchez on 04/09/2024.  -Pre op and 3 day of Hibiclens instructions reviewed and given.   -MRSA/MSSA swab done. Advised patient to apply Bactroban ointment to bilateral nares bid x 5 days if the results are positive.  -Avoid NSAIDs and OTC supplements. Verbalized understanding  -Take the following meds on the day of surgery with small sip of water-Xanax prn  -Verbalized understanding of all preop instructions.

## 2024-04-09 DIAGNOSIS — A49.01 METHICILLIN SUSCEPTIBLE STAPHYLOCOCCUS AUREUS INFECTION, UNSPECIFIED SITE: ICD-10-CM

## 2024-04-09 RX ORDER — MUPIROCIN 20 MG/G
1 OINTMENT TOPICAL
Qty: 1 | Refills: 0
Start: 2024-04-09 | End: 2024-04-13

## 2024-04-12 NOTE — ASU PATIENT PROFILE, ADULT - FALL HARM RISK - UNIVERSAL INTERVENTIONS
Bed in lowest position, wheels locked, appropriate side rails in place/Call bell, personal items and telephone in reach/Instruct patient to call for assistance before getting out of bed or chair/Non-slip footwear when patient is out of bed/[place] to call system/Physically safe environment - no spills, clutter or unnecessary equipment/Purposeful Proactive Rounding/Room/bathroom lighting operational, light cord in reach

## 2024-04-12 NOTE — ASU PATIENT PROFILE, ADULT - MEDICATIONS BROUGHT TO HOSPITAL, PROFILE
COVID-19 SCREENING    Do you or any of your household members have the following symptoms:  Fever >100.0*F or >38.0*C: No    New or worsening cough, shortness of breath, or sore throat: Yes    New onset of nausea, vomiting or diarrhea: No    New onset of loss of taste or smell, chills, repeated shaking with chills, muscle pain, or headache: Yes    Have you, a household member, or another person you have been in contact with tested positive for COVID-19 in the last 14 days?: No    Emergent COVID-19 Symptoms requiring Nurse Triage:  Trouble breathing, Persistent pain or pressure in the chest, New confusion, Inability to wake or stay awake, Bluish lips or face    If any of the above questions are positive and the patient is not having emergent symptoms - order COVID-19 lab test, register, & schedule patient for community testing.     DO NOT schedule any other appointments unless directed by a facility or provider (ex:  being discharged from hospital needing 1st pediatric appointment, TIA clinic patients, etc). Appointments can be canceled, but DO NOT schedule/reschedule patient requested appointments.        no

## 2024-04-12 NOTE — ASU PATIENT PROFILE, ADULT - NSICDXPASTSURGICALHX_GEN_ALL_CORE_FT
PAST SURGICAL HISTORY:  H/O arthroscopy of knee     H/O colonoscopy     History of appendectomy     History of cholecystectomy     History of tonsillectomy

## 2024-04-12 NOTE — ASU PATIENT PROFILE, ADULT - NS TRANSFER DENTURES
Dr. Damián Paul aware of pts inability to collect urine sample at this time.
Pt given discharge and follow up instructions with Dr. Ilan Yeung present in room. Pt tearful with MD. Pt given work note and discharge information. Pt advised to follow up with PCP for referral. No other needs at this time.
Pt states she is unable to collect urine sample at this time because she has not ate or drank anything in days. Offered pt food, drink, and warm blanket. Pt declined.
Patient is ETOH intox, pt has no obvious signs of trauma or injury. Pt in yellow gown belongings secured
n/a

## 2024-04-14 ENCOUNTER — TRANSCRIPTION ENCOUNTER (OUTPATIENT)
Age: 64
End: 2024-04-14

## 2024-04-15 ENCOUNTER — OUTPATIENT (OUTPATIENT)
Dept: OUTPATIENT SERVICES | Facility: HOSPITAL | Age: 64
LOS: 1 days | End: 2024-04-15
Payer: COMMERCIAL

## 2024-04-15 VITALS
TEMPERATURE: 99 F | SYSTOLIC BLOOD PRESSURE: 129 MMHG | OXYGEN SATURATION: 99 % | DIASTOLIC BLOOD PRESSURE: 67 MMHG | HEART RATE: 97 BPM | RESPIRATION RATE: 14 BRPM | WEIGHT: 199.08 LBS | HEIGHT: 63.6 IN

## 2024-04-15 DIAGNOSIS — E11.9 TYPE 2 DIABETES MELLITUS WITHOUT COMPLICATIONS: ICD-10-CM

## 2024-04-15 DIAGNOSIS — Z90.49 ACQUIRED ABSENCE OF OTHER SPECIFIED PARTS OF DIGESTIVE TRACT: Chronic | ICD-10-CM

## 2024-04-15 DIAGNOSIS — M16.12 UNILATERAL PRIMARY OSTEOARTHRITIS, LEFT HIP: ICD-10-CM

## 2024-04-15 DIAGNOSIS — M16.11 UNILATERAL PRIMARY OSTEOARTHRITIS, RIGHT HIP: ICD-10-CM

## 2024-04-15 DIAGNOSIS — Z98.890 OTHER SPECIFIED POSTPROCEDURAL STATES: Chronic | ICD-10-CM

## 2024-04-15 DIAGNOSIS — M19.90 UNSPECIFIED OSTEOARTHRITIS, UNSPECIFIED SITE: ICD-10-CM

## 2024-04-15 DIAGNOSIS — E78.00 PURE HYPERCHOLESTEROLEMIA, UNSPECIFIED: ICD-10-CM

## 2024-04-15 DIAGNOSIS — Z90.89 ACQUIRED ABSENCE OF OTHER ORGANS: Chronic | ICD-10-CM

## 2024-04-15 DIAGNOSIS — E11.65 TYPE 2 DIABETES MELLITUS WITH HYPERGLYCEMIA: ICD-10-CM

## 2024-04-15 LAB
ANION GAP SERPL CALC-SCNC: 4 MMOL/L — LOW (ref 5–17)
BUN SERPL-MCNC: 20 MG/DL — SIGNIFICANT CHANGE UP (ref 7–23)
CALCIUM SERPL-MCNC: 8.2 MG/DL — LOW (ref 8.5–10.1)
CHLORIDE SERPL-SCNC: 108 MMOL/L — SIGNIFICANT CHANGE UP (ref 96–108)
CO2 SERPL-SCNC: 26 MMOL/L — SIGNIFICANT CHANGE UP (ref 22–31)
CREAT SERPL-MCNC: 0.81 MG/DL — SIGNIFICANT CHANGE UP (ref 0.5–1.3)
EGFR: 83 ML/MIN/1.73M2 — SIGNIFICANT CHANGE UP
GLUCOSE BLDC GLUCOMTR-MCNC: 190 MG/DL — HIGH (ref 70–99)
GLUCOSE BLDC GLUCOMTR-MCNC: 238 MG/DL — HIGH (ref 70–99)
GLUCOSE BLDC GLUCOMTR-MCNC: 251 MG/DL — HIGH (ref 70–99)
GLUCOSE BLDC GLUCOMTR-MCNC: 282 MG/DL — HIGH (ref 70–99)
GLUCOSE BLDC GLUCOMTR-MCNC: 319 MG/DL — HIGH (ref 70–99)
GLUCOSE SERPL-MCNC: 324 MG/DL — HIGH (ref 70–99)
HCT VFR BLD CALC: 43.2 % — SIGNIFICANT CHANGE UP (ref 34.5–45)
HGB BLD-MCNC: 14 G/DL — SIGNIFICANT CHANGE UP (ref 11.5–15.5)
MCHC RBC-ENTMCNC: 31.6 PG — SIGNIFICANT CHANGE UP (ref 27–34)
MCHC RBC-ENTMCNC: 32.4 GM/DL — SIGNIFICANT CHANGE UP (ref 32–36)
MCV RBC AUTO: 97.5 FL — SIGNIFICANT CHANGE UP (ref 80–100)
NRBC # BLD: 0 /100 WBCS — SIGNIFICANT CHANGE UP (ref 0–0)
PLATELET # BLD AUTO: 359 K/UL — SIGNIFICANT CHANGE UP (ref 150–400)
POTASSIUM SERPL-MCNC: 5 MMOL/L — SIGNIFICANT CHANGE UP (ref 3.5–5.3)
POTASSIUM SERPL-SCNC: 5 MMOL/L — SIGNIFICANT CHANGE UP (ref 3.5–5.3)
RBC # BLD: 4.43 M/UL — SIGNIFICANT CHANGE UP (ref 3.8–5.2)
RBC # FLD: 13.3 % — SIGNIFICANT CHANGE UP (ref 10.3–14.5)
SODIUM SERPL-SCNC: 138 MMOL/L — SIGNIFICANT CHANGE UP (ref 135–145)
WBC # BLD: 12.57 K/UL — HIGH (ref 3.8–10.5)
WBC # FLD AUTO: 12.57 K/UL — HIGH (ref 3.8–10.5)

## 2024-04-15 PROCEDURE — 73501 X-RAY EXAM HIP UNI 1 VIEW: CPT | Mod: 26,LT

## 2024-04-15 PROCEDURE — 99212 OFFICE O/P EST SF 10 MIN: CPT

## 2024-04-15 DEVICE — SHELL ACET PINNACLE SECTOR W/ GRIPTION 54MM: Type: IMPLANTABLE DEVICE | Site: LEFT | Status: FUNCTIONAL

## 2024-04-15 DEVICE — IMPLANTABLE DEVICE: Type: IMPLANTABLE DEVICE | Site: LEFT | Status: FUNCTIONAL

## 2024-04-15 DEVICE — PLUG H ELIM POS STP APEX: Type: IMPLANTABLE DEVICE | Site: LEFT | Status: FUNCTIONAL

## 2024-04-15 DEVICE — LINER PINNACLE ALTRX 36X54MM: Type: IMPLANTABLE DEVICE | Site: LEFT | Status: FUNCTIONAL

## 2024-04-15 DEVICE — HEAD FEM CERAMIC 12/14 36MM PLUS 5: Type: IMPLANTABLE DEVICE | Site: LEFT | Status: FUNCTIONAL

## 2024-04-15 DEVICE — ANCHOR SUT VERSALOOP 2 1.8MM: Type: IMPLANTABLE DEVICE | Site: LEFT | Status: FUNCTIONAL

## 2024-04-15 RX ORDER — HYDROMORPHONE HYDROCHLORIDE 2 MG/ML
1 INJECTION INTRAMUSCULAR; INTRAVENOUS; SUBCUTANEOUS
Refills: 0 | Status: DISCONTINUED | OUTPATIENT
Start: 2024-04-15 | End: 2024-04-15

## 2024-04-15 RX ORDER — METFORMIN HYDROCHLORIDE 850 MG/1
2 TABLET ORAL
Refills: 0 | DISCHARGE

## 2024-04-15 RX ORDER — EMPAGLIFLOZIN 10 MG/1
1 TABLET, FILM COATED ORAL
Qty: 0 | Refills: 0 | DISCHARGE

## 2024-04-15 RX ORDER — TRAMADOL HYDROCHLORIDE 50 MG/1
50 TABLET ORAL EVERY 6 HOURS
Refills: 0 | Status: DISCONTINUED | OUTPATIENT
Start: 2024-04-15 | End: 2024-04-15

## 2024-04-15 RX ORDER — ATORVASTATIN CALCIUM 80 MG/1
1 TABLET, FILM COATED ORAL
Refills: 0 | DISCHARGE

## 2024-04-15 RX ORDER — FAMOTIDINE 10 MG/ML
40 INJECTION INTRAVENOUS EVERY 12 HOURS
Refills: 0 | Status: DISCONTINUED | OUTPATIENT
Start: 2024-04-15 | End: 2024-04-29

## 2024-04-15 RX ORDER — TRAMADOL HYDROCHLORIDE 50 MG/1
100 TABLET ORAL EVERY 6 HOURS
Refills: 0 | Status: DISCONTINUED | OUTPATIENT
Start: 2024-04-15 | End: 2024-04-15

## 2024-04-15 RX ORDER — GLUCAGON INJECTION, SOLUTION 0.5 MG/.1ML
1 INJECTION, SOLUTION SUBCUTANEOUS ONCE
Refills: 0 | Status: DISCONTINUED | OUTPATIENT
Start: 2024-04-15 | End: 2024-04-29

## 2024-04-15 RX ORDER — SODIUM CHLORIDE 9 MG/ML
1000 INJECTION INTRAMUSCULAR; INTRAVENOUS; SUBCUTANEOUS
Refills: 0 | Status: DISCONTINUED | OUTPATIENT
Start: 2024-04-15 | End: 2024-04-29

## 2024-04-15 RX ORDER — VANCOMYCIN HCL 1 G
1500 VIAL (EA) INTRAVENOUS ONCE
Refills: 0 | Status: COMPLETED | OUTPATIENT
Start: 2024-04-15 | End: 2024-04-15

## 2024-04-15 RX ORDER — ATORVASTATIN CALCIUM 80 MG/1
20 TABLET, FILM COATED ORAL AT BEDTIME
Refills: 0 | Status: DISCONTINUED | OUTPATIENT
Start: 2024-04-15 | End: 2024-04-29

## 2024-04-15 RX ORDER — INSULIN GLARGINE 100 [IU]/ML
20 INJECTION, SOLUTION SUBCUTANEOUS AT BEDTIME
Refills: 0 | Status: DISCONTINUED | OUTPATIENT
Start: 2024-04-15 | End: 2024-04-29

## 2024-04-15 RX ORDER — VANCOMYCIN HCL 1 G
1500 VIAL (EA) INTRAVENOUS ONCE
Refills: 0 | Status: DISCONTINUED | OUTPATIENT
Start: 2024-04-16 | End: 2024-04-29

## 2024-04-15 RX ORDER — MAGNESIUM HYDROXIDE 400 MG/1
30 TABLET, CHEWABLE ORAL DAILY
Refills: 0 | Status: DISCONTINUED | OUTPATIENT
Start: 2024-04-15 | End: 2024-04-29

## 2024-04-15 RX ORDER — ASPIRIN/CALCIUM CARB/MAGNESIUM 324 MG
325 TABLET ORAL
Refills: 0 | Status: DISCONTINUED | OUTPATIENT
Start: 2024-04-16 | End: 2024-04-29

## 2024-04-15 RX ORDER — DEXTROSE 10 % IN WATER 10 %
125 INTRAVENOUS SOLUTION INTRAVENOUS ONCE
Refills: 0 | Status: DISCONTINUED | OUTPATIENT
Start: 2024-04-15 | End: 2024-04-29

## 2024-04-15 RX ORDER — ACETAMINOPHEN 500 MG
1000 TABLET ORAL ONCE
Refills: 0 | Status: COMPLETED | OUTPATIENT
Start: 2024-04-16 | End: 2024-04-16

## 2024-04-15 RX ORDER — SODIUM CHLORIDE 9 MG/ML
1000 INJECTION, SOLUTION INTRAVENOUS
Refills: 0 | Status: DISCONTINUED | OUTPATIENT
Start: 2024-04-15 | End: 2024-04-15

## 2024-04-15 RX ORDER — ASCORBIC ACID 60 MG
1 TABLET,CHEWABLE ORAL
Refills: 0 | DISCHARGE

## 2024-04-15 RX ORDER — DEXTROSE 50 % IN WATER 50 %
15 SYRINGE (ML) INTRAVENOUS ONCE
Refills: 0 | Status: DISCONTINUED | OUTPATIENT
Start: 2024-04-15 | End: 2024-04-29

## 2024-04-15 RX ORDER — SODIUM CHLORIDE 9 MG/ML
1000 INJECTION, SOLUTION INTRAVENOUS
Refills: 0 | Status: DISCONTINUED | OUTPATIENT
Start: 2024-04-15 | End: 2024-04-29

## 2024-04-15 RX ORDER — ACETAMINOPHEN 500 MG
1000 TABLET ORAL ONCE
Refills: 0 | Status: COMPLETED | OUTPATIENT
Start: 2024-04-15 | End: 2024-04-15

## 2024-04-15 RX ORDER — CHOLECALCIFEROL (VITAMIN D3) 125 MCG
10 CAPSULE ORAL
Refills: 0 | DISCHARGE

## 2024-04-15 RX ORDER — INSULIN LISPRO 100/ML
VIAL (ML) SUBCUTANEOUS
Refills: 0 | Status: DISCONTINUED | OUTPATIENT
Start: 2024-04-15 | End: 2024-04-29

## 2024-04-15 RX ORDER — HYDROMORPHONE HYDROCHLORIDE 2 MG/ML
0.5 INJECTION INTRAMUSCULAR; INTRAVENOUS; SUBCUTANEOUS
Refills: 0 | Status: DISCONTINUED | OUTPATIENT
Start: 2024-04-15 | End: 2024-04-15

## 2024-04-15 RX ORDER — ALPRAZOLAM 0.25 MG
1 TABLET ORAL
Refills: 0 | DISCHARGE

## 2024-04-15 RX ORDER — CELECOXIB 200 MG/1
200 CAPSULE ORAL EVERY 12 HOURS
Refills: 0 | Status: DISCONTINUED | OUTPATIENT
Start: 2024-04-16 | End: 2024-04-29

## 2024-04-15 RX ORDER — ONDANSETRON 8 MG/1
4 TABLET, FILM COATED ORAL EVERY 6 HOURS
Refills: 0 | Status: DISCONTINUED | OUTPATIENT
Start: 2024-04-15 | End: 2024-04-29

## 2024-04-15 RX ORDER — INSULIN LISPRO 100/ML
VIAL (ML) SUBCUTANEOUS AT BEDTIME
Refills: 0 | Status: DISCONTINUED | OUTPATIENT
Start: 2024-04-15 | End: 2024-04-29

## 2024-04-15 RX ORDER — POLYETHYLENE GLYCOL 3350 17 G/17G
17 POWDER, FOR SOLUTION ORAL AT BEDTIME
Refills: 0 | Status: DISCONTINUED | OUTPATIENT
Start: 2024-04-15 | End: 2024-04-29

## 2024-04-15 RX ORDER — ACETAMINOPHEN 500 MG
650 TABLET ORAL EVERY 6 HOURS
Refills: 0 | Status: DISCONTINUED | OUTPATIENT
Start: 2024-04-16 | End: 2024-04-29

## 2024-04-15 RX ORDER — DEXTROSE 50 % IN WATER 50 %
25 SYRINGE (ML) INTRAVENOUS ONCE
Refills: 0 | Status: DISCONTINUED | OUTPATIENT
Start: 2024-04-15 | End: 2024-04-29

## 2024-04-15 RX ORDER — BUPIVACAINE 13.3 MG/ML
20 INJECTION, SUSPENSION, LIPOSOMAL INFILTRATION ONCE
Refills: 0 | Status: DISCONTINUED | OUTPATIENT
Start: 2024-04-15 | End: 2024-04-15

## 2024-04-15 RX ORDER — DEXTROSE 50 % IN WATER 50 %
12.5 SYRINGE (ML) INTRAVENOUS ONCE
Refills: 0 | Status: DISCONTINUED | OUTPATIENT
Start: 2024-04-15 | End: 2024-04-29

## 2024-04-15 RX ORDER — INSULIN DEGLUDEC 100 U/ML
20 INJECTION, SOLUTION SUBCUTANEOUS
Refills: 0 | DISCHARGE

## 2024-04-15 RX ORDER — SENNA PLUS 8.6 MG/1
2 TABLET ORAL AT BEDTIME
Refills: 0 | Status: DISCONTINUED | OUTPATIENT
Start: 2024-04-15 | End: 2024-04-29

## 2024-04-15 RX ORDER — ONDANSETRON 8 MG/1
4 TABLET, FILM COATED ORAL ONCE
Refills: 0 | Status: DISCONTINUED | OUTPATIENT
Start: 2024-04-15 | End: 2024-04-15

## 2024-04-15 RX ORDER — HYDROMORPHONE HYDROCHLORIDE 2 MG/ML
0.5 INJECTION INTRAMUSCULAR; INTRAVENOUS; SUBCUTANEOUS EVERY 6 HOURS
Refills: 0 | Status: DISCONTINUED | OUTPATIENT
Start: 2024-04-15 | End: 2024-04-16

## 2024-04-15 RX ORDER — SEMAGLUTIDE 0.68 MG/ML
1 INJECTION, SOLUTION SUBCUTANEOUS
Refills: 0 | DISCHARGE

## 2024-04-15 RX ORDER — INSULIN LISPRO 100/ML
15 VIAL (ML) SUBCUTANEOUS
Refills: 0 | DISCHARGE

## 2024-04-15 RX ADMIN — TRAMADOL HYDROCHLORIDE 50 MILLIGRAM(S): 50 TABLET ORAL at 22:32

## 2024-04-15 RX ADMIN — Medication 6: at 12:36

## 2024-04-15 RX ADMIN — SODIUM CHLORIDE 75 MILLILITER(S): 9 INJECTION, SOLUTION INTRAVENOUS at 12:36

## 2024-04-15 RX ADMIN — FAMOTIDINE 40 MILLIGRAM(S): 10 INJECTION INTRAVENOUS at 17:54

## 2024-04-15 RX ADMIN — Medication 300 MILLIGRAM(S): at 08:30

## 2024-04-15 RX ADMIN — TRAMADOL HYDROCHLORIDE 50 MILLIGRAM(S): 50 TABLET ORAL at 22:46

## 2024-04-15 RX ADMIN — ATORVASTATIN CALCIUM 20 MILLIGRAM(S): 80 TABLET, FILM COATED ORAL at 21:35

## 2024-04-15 RX ADMIN — INSULIN GLARGINE 20 UNIT(S): 100 INJECTION, SOLUTION SUBCUTANEOUS at 22:33

## 2024-04-15 RX ADMIN — Medication 4: at 17:53

## 2024-04-15 RX ADMIN — SODIUM CHLORIDE 50 MILLILITER(S): 9 INJECTION, SOLUTION INTRAVENOUS at 08:28

## 2024-04-15 RX ADMIN — Medication 400 MILLIGRAM(S): at 17:54

## 2024-04-15 RX ADMIN — Medication 2: at 22:00

## 2024-04-15 NOTE — PHYSICAL THERAPY INITIAL EVALUATION ADULT - NSPTDMEREC_GEN_A_CORE
The patient has a mobility limitation that significantly impairs his/her ability to participate in one or more mobility related activities of daily living in the home.  The patient is able to safely use the walker and the functional mobility deficit can be sufficiently resolved by use of a walker./rolling walker

## 2024-04-15 NOTE — CONSULT NOTE ADULT - CONSULT REASON
med eval
63y A1C with Estimated Average Glucose Result: 8.0 % (04-08-24 @ 08:00)  A1C with Estimated Average Glucose Result: 9.0 % (02-13-24 @ 13:25)   diabetes mellitus uncontrolled type 2

## 2024-04-15 NOTE — PHYSICAL THERAPY INITIAL EVALUATION ADULT - RANGE OF MOTION EXAMINATION, REHAB EVAL
L hip flexion:90/bilateral upper extremity ROM was WNL (within normal limits)/Right LE ROM was WNL (within normal limits)

## 2024-04-15 NOTE — PHYSICAL THERAPY INITIAL EVALUATION ADULT - GAIT TRAINING, PT EVAL
Patient will ambulate x 200 feet independently with appropriate device in 2 weeks in order to increase mobility at home

## 2024-04-15 NOTE — PHYSICAL THERAPY INITIAL EVALUATION ADULT - ADDITIONAL COMMENTS
Patient lives in apartment, [address] or inside. Patient was independent in all ADLs and ambulation without device. Patient has tub shower and SAC.

## 2024-04-15 NOTE — CONSULT NOTE ADULT - SUBJECTIVE AND OBJECTIVE BOX
Patient is a 63y old  Female who presents with a chief complaint of L URMILA (15 Apr 2024 14:15)      INTERVAL HPI/OVERNIGHT EVENTS:  T(C): 36.8 (04-15-24 @ 21:17), Max: 37 (04-15-24 @ 07:51)  HR: 107 (04-15-24 @ 21:17) (71 - 107)  BP: 107/67 (04-15-24 @ 21:17) (107/67 - 146/69)  RR: 18 (04-15-24 @ 21:17) (12 - 18)  SpO2: 93% (04-15-24 @ 21:17) (91% - 100%)  Wt(kg): --  I&O's Summary      PAST MEDICAL & SURGICAL HISTORY:  History of Cholecystectomy 2010      GERD (Gastroesophageal Reflux Disease)      History of Tonsillectomy      Knee Injury s/p arthroscopy x 4 and surgical repair from 2003 - 2008      Amputation right 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/t injury in 2000      Anxiety      Unilateral primary osteoarthritis, left hip      Positive culture findings in nose      Elevated hemoglobin A1c      MSSA (methicillin susceptible Staphylococcus aureus) infection      H/O arthroscopy of knee      History of appendectomy      History of cholecystectomy      H/O colonoscopy      History of tonsillectomy          SOCIAL HISTORY  Alcohol:neg  Tobacco: neg  Illicit substance use:      FAMILY HISTORY: nc    Home Medications:  ALPRAZolam 0.25 mg oral tablet: 1 tab(s) orally prn (15 Apr 2024 07:49)  ascorbic acid 1000 mg oral tablet: 1 tab(s) orally once a day (in the morning) (15 Apr 2024 07:49)  atorvastatin 20 mg oral tablet: 1 tab(s) orally once a day (15 Apr 2024 07:49)  cholecalciferol oral tablet: 10 microgram(s) orally once a day (in the morning) (15 Apr 2024 07:49)  glimepiride 4 mg oral tablet: 1 tab(s) orally 2 times a day (15 Apr 2024 07:49)  HumaLOG Cartridge 100 units/mL injectable solution: 15 unit(s) injectable 3 times a day (15 Apr 2024 07:49)  Jardiance 25 mg oral tablet: 1 tab(s) orally once a day (in the morning) (15 Apr 2024 07:49)  metFORMIN 500 mg oral tablet, extended release: 2 tab(s) orally 2 times a day (15 Apr 2024 07:49)  Ozempic 4 mg/3 mL (1 mg dose) subcutaneous solution: 1 milligram(s) subcutaneously once a week (15 Apr 2024 07:49)  Tresiba FlexTouch 100 units/mL subcutaneous solution: 20 unit(s) subcutaneous once a day (at bedtime) (15 Apr 2024 07:49)        LABS:                        14.0   12.57 )-----------( 359      ( 15 Apr 2024 12:45 )             43.2     04-15    138  |  108  |  20  ----------------------------<  324<H>  5.0   |  26  |  0.81    Ca    8.2<L>      15 Apr 2024 12:45        Urinalysis Basic - ( 15 Apr 2024 12:45 )    Color: x / Appearance: x / SG: x / pH: x  Gluc: 324 mg/dL / Ketone: x  / Bili: x / Urobili: x   Blood: x / Protein: x / Nitrite: x   Leuk Esterase: x / RBC: x / WBC x   Sq Epi: x / Non Sq Epi: x / Bacteria: x      CAPILLARY BLOOD GLUCOSE      POCT Blood Glucose.: 251 mg/dL (15 Apr 2024 21:25)  POCT Blood Glucose.: 238 mg/dL (15 Apr 2024 17:11)  POCT Blood Glucose.: 282 mg/dL (15 Apr 2024 12:18)  POCT Blood Glucose.: 190 mg/dL (15 Apr 2024 07:55)        Urinalysis Basic - ( 15 Apr 2024 12:45 )    Color: x / Appearance: x / SG: x / pH: x  Gluc: 324 mg/dL / Ketone: x  / Bili: x / Urobili: x   Blood: x / Protein: x / Nitrite: x   Leuk Esterase: x / RBC: x / WBC x   Sq Epi: x / Non Sq Epi: x / Bacteria: x        MEDICATIONS  (STANDING):  atorvastatin 20 milliGRAM(s) Oral at bedtime  dextrose 10% Bolus 125 milliLiter(s) IV Bolus once  dextrose 5%. 1000 milliLiter(s) (100 mL/Hr) IV Continuous <Continuous>  dextrose 5%. 1000 milliLiter(s) (50 mL/Hr) IV Continuous <Continuous>  dextrose 50% Injectable 12.5 Gram(s) IV Push once  dextrose 50% Injectable 25 Gram(s) IV Push Once  dextrose 50% Injectable 25 Gram(s) IV Push Once  dextrose 50% Injectable 12.5 Gram(s) IV Push Once  dextrose 50% Injectable 25 Gram(s) IV Push once  dextrose Oral Gel 15 Gram(s) Oral Once  famotidine    Tablet 40 milliGRAM(s) Oral every 12 hours  glucagon  Injectable 1 milliGRAM(s) IntraMuscular Once  glucagon  Injectable 1 milliGRAM(s) IntraMuscular once  insulin glargine Injectable (LANTUS) 20 Unit(s) SubCutaneous at bedtime  insulin lispro (ADMELOG) corrective regimen sliding scale   SubCutaneous at bedtime  insulin lispro (ADMELOG) corrective regimen sliding scale   SubCutaneous three times a day before meals  polyethylene glycol 3350 17 Gram(s) Oral at bedtime  senna 2 Tablet(s) Oral at bedtime  sodium chloride 0.9%. 1000 milliLiter(s) (75 mL/Hr) IV Continuous <Continuous>    MEDICATIONS  (PRN):  dextrose Oral Gel 15 Gram(s) Oral once PRN Blood Glucose LESS THAN 70 milliGRAM(s)/deciliter  HYDROmorphone  Injectable 1 milliGRAM(s) IV Push every 10 minutes PRN Severe Pain (7 - 10)  HYDROmorphone  Injectable 0.5 milliGRAM(s) IV Push every 6 hours PRN Severe Pain (7 - 10)  magnesium hydroxide Suspension 30 milliLiter(s) Oral daily PRN Constipation  ondansetron Injectable 4 milliGRAM(s) IV Push every 6 hours PRN Nausea and/or Vomiting  traMADol 50 milliGRAM(s) Oral every 6 hours PRN Mild Pain (1 - 3)  traMADol 100 milliGRAM(s) Oral every 6 hours PRN Moderate Pain (4 - 6)      REVIEW OF SYSTEMS:  CONSTITUTIONAL: No fever, weight loss, or fatigue  EYES: No eye pain, visual disturbances, or discharge  ENMT:  No difficulty hearing, tinnitus, vertigo; No sinus or throat pain  NECK: No pain or stiffness  RESPIRATORY: No cough, wheezing, chills or hemoptysis; No shortness of breath  CARDIOVASCULAR: No chest pain, palpitations, dizziness, or leg swelling  GASTROINTESTINAL: No abdominal or epigastric pain. No nausea, vomiting, or hematemesis; No diarrhea or constipation. No melena or hematochezia.  GENITOURINARY: No dysuria, frequency, hematuria, or incontinence  NEUROLOGICAL: No headaches, memory loss, loss of strength, numbness, or tremors  SKIN: No itching, burning, rashes, or lesions   LYMPH NODES: No enlarged glands  ENDOCRINE: No heat or cold intolerance; No hair loss  MUSCULOSKELETAL: chronic left hip ain  PSYCHIATRIC: No depression, anxiety, mood swings, or difficulty sleeping  HEME/LYMPH: No easy bruising, or bleeding gums  ALLERY AND IMMUNOLOGIC: No hives or eczema    RADIOLOGY & ADDITIONAL TESTS:    Imaging Personally Reviewed:  [ x] YES  [ ] NO    Consultant(s) Notes Reviewed:  [x ] YES  [ ] NO        PHYSICAL EXAM:  GENERAL: NAD, well-groomed, well-developed  HEAD:  Atraumatic, Normocephalic  EYES: EOMI, PERRLA, conjunctiva and sclera clear  ENMT: No tonsillar erythema, exudates, or enlargement; Moist mucous membranes, Good dentition, No lesions  NECK: Supple, No JVD, Normal thyroid  NERVOUS SYSTEM:  Alert & Oriented X3, Good concentration; Motor Strength 5/5 B/L upper and lower extremities; DTRs 2+ intact and symmetric  CHEST/LUNG: Clear to percussion bilaterally; No rales, rhonchi, wheezing, or rubs  HEART: Regular rate and rhythm; No murmurs, rubs, or gallops  ABDOMEN: Soft, Nontender, Nondistended; Bowel sounds present  EXTREMITIES:  2+ Peripheral Pulses, No clubbing, cyanosis, or edema  LYMPH: No lymphadenopathy noted  SKIN: No rashes or lesions    Care Discussed with Consultants/Other Providers [ x] YES  [ ] NO    advance care planning and advance directives discussed, including but not limited to long term care planning, and all forms reviewed [x]YES  [ ]NO
Patient is a 63y old  Female who presents with a chief complaint of L URMILA (15 Apr 2024 14:15)    Type 2 DM DX 5 years ago, no known complications. managed by Endocrine Dr. Gustavo Almodovar, seen in January 2024, current a1c 8.0%, down from 9%. rx home metformin 1000mg BID, tresiba 20 units @ HS, humalog 15 units TID before meals, ozempic 4mg weekly injection, glimepiride 4mg BID, jardiance 25mg daily. explained ISS and titrating humalog dose based on premeal f/s and carbs for the meal. pt uses dexcom cgm daily, reports readings usually <200mg/dL has had occasional hypoglycemia with humalog dose, reviewed s/s and management w/ 15/15 rule. reviewed CC diet and provided meal planning guide. explained importance of keeping BG in range for sx site healing. pt states she was walking 2 miles in the mornings, reviewed 150min/week mod intensity exercise as tolerated. pt reports has all supplies and meds, no needs, verbal education and handouts provided.  diabetes education provided- A1c measure and BG targets  fasting, <180 2 hours postmeal. medication MOA and considerations/side effects, inhospital BGM frequency and insulin administration, s/s of hyperglycemia/hypoglycemia and management, glycemic control and preventing complications, consistent carb diet, balanced plate method, consistent meal planning. sick day management, provider f/u    HPI:      PAST MEDICAL & SURGICAL HISTORY:  History of Cholecystectomy 2010      GERD (Gastroesophageal Reflux Disease)      History of Tonsillectomy      Knee Injury s/p arthroscopy x 4 and surgical repair from 2003 - 2008      Amputation right 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/t injury in 2000      Anxiety      Unilateral primary osteoarthritis, left hip      Positive culture findings in nose      Elevated hemoglobin A1c      MSSA (methicillin susceptible Staphylococcus aureus) infection      H/O arthroscopy of knee      History of appendectomy      History of cholecystectomy      H/O colonoscopy      History of tonsillectomy      Allergies    amoxicillin (Rash)    Intolerances    codeine (Other)      MEDICATIONS  (STANDING):  acetaminophen   IVPB .. 1000 milliGRAM(s) IV Intermittent once  atorvastatin 20 milliGRAM(s) Oral at bedtime  dextrose 10% Bolus 125 milliLiter(s) IV Bolus once  dextrose 5%. 1000 milliLiter(s) (100 mL/Hr) IV Continuous <Continuous>  dextrose 5%. 1000 milliLiter(s) (50 mL/Hr) IV Continuous <Continuous>  dextrose 50% Injectable 12.5 Gram(s) IV Push once  dextrose 50% Injectable 25 Gram(s) IV Push Once  dextrose 50% Injectable 25 Gram(s) IV Push Once  dextrose 50% Injectable 12.5 Gram(s) IV Push Once  dextrose 50% Injectable 25 Gram(s) IV Push once  dextrose Oral Gel 15 Gram(s) Oral Once  famotidine    Tablet 40 milliGRAM(s) Oral every 12 hours  glucagon  Injectable 1 milliGRAM(s) IntraMuscular Once  glucagon  Injectable 1 milliGRAM(s) IntraMuscular once  insulin lispro (ADMELOG) corrective regimen sliding scale   SubCutaneous at bedtime  insulin lispro (ADMELOG) corrective regimen sliding scale   SubCutaneous three times a day before meals  polyethylene glycol 3350 17 Gram(s) Oral at bedtime  senna 2 Tablet(s) Oral at bedtime  sodium chloride 0.9%. 1000 milliLiter(s) (75 mL/Hr) IV Continuous <Continuous>

## 2024-04-15 NOTE — CONSULT NOTE ADULT - ASSESSMENT
Physical Exam:   Vital Signs Last 24 Hrs  T(C): 36.6 (15 Apr 2024 13:41), Max: 37 (15 Apr 2024 07:51)  T(F): 97.8 (15 Apr 2024 13:41), Max: 98.6 (15 Apr 2024 07:51)  HR: 76 (15 Apr 2024 13:41) (71 - 97)  BP: 116/77 (15 Apr 2024 13:41) (116/64 - 146/69)  BP(mean): --  RR: 16 (15 Apr 2024 13:41) (12 - 16)  SpO2: 94% (15 Apr 2024 13:41) (94% - 100%)    Parameters below as of 15 Apr 2024 13:41  Patient On (Oxygen Delivery Method): nasal cannula        General: NAD, denies Fever, chills  CVS: S1S2 no M/R/G  Resp: CTA in all fields  : no freq, no urgency, no dysuria  Extremeties: no edema, + pulses         CAPILLARY BLOOD GLUCOSE      POCT Blood Glucose.: 282 mg/dL (15 Apr 2024 12:18)  POCT Blood Glucose.: 190 mg/dL (15 Apr 2024 07:55)      Cholesterol, Serum: 113 mg/dL (05.19.21 @ 08:36)     HDL Cholesterol, Serum: 22 mg/dL (05.19.21 @ 08:36)     LDL Cholesterol Calculated: 66 mg/dL (05.19.21 @ 08:36)     DIET: CC  >50%

## 2024-04-15 NOTE — PHYSICAL THERAPY INITIAL EVALUATION ADULT - SITTING BALANCE: STATIC
Patient would like communication of their results via:    [employer]    Cell Phone:   Telephone Information:   Mobile [phone]     good balance

## 2024-04-15 NOTE — CONSULT NOTE ADULT - PROBLEM SELECTOR RECOMMENDATION 9
Type 2 A1c 8.0% adm L hip OA  add 20 units Lantus @ HS  c/w mod ISS ACHS  CC diet as tolerated  Recommend endocrine-Perlman onconsult  FU appt: Dr. Gustavo Almodovar, pt states she will make appt  DSC recommendations: return to home regimen and glucose monitoring, lifestyle and diet modifications  diabetes education provided as documented above  Diabetes support info and cell # [phone] given   Goal 100-180 mg/dL; 140-180 mg/dL in critical care areas Type 2 A1c 8.0% adm L hip OA  add 20 units Lantus @ HS  c/w mod ISS ACHS  CC diet as tolerated  Recommend endocrine-Perlman onconsult  FU appt: Dr. Gustavo Almodovar, pt states she will make appt  DSC recommendations: return to home regimen metformin 1000mg BID, tresiba 20 units @ HS, humalog 15 units ISS TID before meals, ozempic 4mg weekly injection, glimepiride 4mg BID, jardiance 25mg daily and glucose monitoring, lifestyle and diet modifications  diabetes education provided as documented above  Diabetes support info and cell # [phone] given   Goal 100-180 mg/dL; 140-180 mg/dL in critical care areas

## 2024-04-15 NOTE — CONSULT NOTE ADULT - PSYCHIATRIC
Department of Psychiatry  Attending History and Physical - Adult         CHIEF COMPLAINT: Suicidal ideations    History obtained from:  patient    HISTORY OF PRESENT ILLNESS:          The patient is a 47 y.o. male with previous psychiatric history of anxiety, who has been admitted to our psychiatric unit, following transfer from medical services, where patient was admitted for alcohol intoxication and after intentional overdose on prescribed medications.    For initial psychiatric evaluation, please, refer to my consult note, as reflected below:  \"Patient has been seen in his room with presence of one-to-one sitter.  He stated \"there is no reasons to continue to live\", as he \"lost everything\".  Patient stated that he was laid off from his job and did not have any money to support himself or his fiancée, stated \"I lost my family, I lost my fiancée and I am losing my house\".  He stated that his car broke down and he cannot go to any other places to get a new job.  S, patient decided to kill himself by overdose approximately on 10 pills of Lisinopril and 10-15 pills of Benadryl.     He reported that he stopped drinking alcohol in November 2023, however, due to current life stressors, he relapsed in drinking alcohol and has been drinking for 6 days \"as much as I could\".  Patient denies significant withdrawal symptoms from alcohol today.  He denies history of withdrawal seizures or Dts, when he stopped drinking alcohol.     In regards of affective symptomatology, he reported feeling of depression, poor motivation, poor concentration, decreased pleasure in previously enjoyed activities, decreased energy level, frequent thoughts about the death.   Patient endorses fair appetite and fair quality of sleep at home.  He reported feeling of hopelessness and helplessness.  Patient denies current active suicidal or homicidal ideations, denies any plans.  However, stated that he does not see reasons to continue to live.  He denies  normal/normal affect/alert and oriented x3/normal behavior

## 2024-04-16 ENCOUNTER — TRANSCRIPTION ENCOUNTER (OUTPATIENT)
Age: 64
End: 2024-04-16

## 2024-04-16 VITALS
TEMPERATURE: 98 F | HEART RATE: 95 BPM | SYSTOLIC BLOOD PRESSURE: 114 MMHG | OXYGEN SATURATION: 92 % | RESPIRATION RATE: 20 BRPM | DIASTOLIC BLOOD PRESSURE: 75 MMHG

## 2024-04-16 LAB
ANION GAP SERPL CALC-SCNC: 10 MMOL/L — SIGNIFICANT CHANGE UP (ref 5–17)
BUN SERPL-MCNC: 14 MG/DL — SIGNIFICANT CHANGE UP (ref 7–23)
CALCIUM SERPL-MCNC: 9.1 MG/DL — SIGNIFICANT CHANGE UP (ref 8.5–10.1)
CHLORIDE SERPL-SCNC: 108 MMOL/L — SIGNIFICANT CHANGE UP (ref 96–108)
CO2 SERPL-SCNC: 24 MMOL/L — SIGNIFICANT CHANGE UP (ref 22–31)
CREAT SERPL-MCNC: 0.87 MG/DL — SIGNIFICANT CHANGE UP (ref 0.5–1.3)
EGFR: 76 ML/MIN/1.73M2 — SIGNIFICANT CHANGE UP
GLUCOSE BLDC GLUCOMTR-MCNC: 218 MG/DL — HIGH (ref 70–99)
GLUCOSE BLDC GLUCOMTR-MCNC: 220 MG/DL — HIGH (ref 70–99)
GLUCOSE SERPL-MCNC: 211 MG/DL — HIGH (ref 70–99)
HCT VFR BLD CALC: 41.3 % — SIGNIFICANT CHANGE UP (ref 34.5–45)
HGB BLD-MCNC: 13.5 G/DL — SIGNIFICANT CHANGE UP (ref 11.5–15.5)
MCHC RBC-ENTMCNC: 31.3 PG — SIGNIFICANT CHANGE UP (ref 27–34)
MCHC RBC-ENTMCNC: 32.7 GM/DL — SIGNIFICANT CHANGE UP (ref 32–36)
MCV RBC AUTO: 95.8 FL — SIGNIFICANT CHANGE UP (ref 80–100)
NRBC # BLD: 0 /100 WBCS — SIGNIFICANT CHANGE UP (ref 0–0)
PLATELET # BLD AUTO: 339 K/UL — SIGNIFICANT CHANGE UP (ref 150–400)
POTASSIUM SERPL-MCNC: 4.1 MMOL/L — SIGNIFICANT CHANGE UP (ref 3.5–5.3)
POTASSIUM SERPL-SCNC: 4.1 MMOL/L — SIGNIFICANT CHANGE UP (ref 3.5–5.3)
RBC # BLD: 4.31 M/UL — SIGNIFICANT CHANGE UP (ref 3.8–5.2)
RBC # FLD: 13.3 % — SIGNIFICANT CHANGE UP (ref 10.3–14.5)
SODIUM SERPL-SCNC: 142 MMOL/L — SIGNIFICANT CHANGE UP (ref 135–145)
WBC # BLD: 10.44 K/UL — SIGNIFICANT CHANGE UP (ref 3.8–10.5)
WBC # FLD AUTO: 10.44 K/UL — SIGNIFICANT CHANGE UP (ref 3.8–10.5)

## 2024-04-16 PROCEDURE — C1776: CPT

## 2024-04-16 PROCEDURE — 36415 COLL VENOUS BLD VENIPUNCTURE: CPT

## 2024-04-16 PROCEDURE — 80048 BASIC METABOLIC PNL TOTAL CA: CPT

## 2024-04-16 PROCEDURE — 27130 TOTAL HIP ARTHROPLASTY: CPT | Mod: LT

## 2024-04-16 PROCEDURE — 85027 COMPLETE CBC AUTOMATED: CPT

## 2024-04-16 PROCEDURE — 97116 GAIT TRAINING THERAPY: CPT

## 2024-04-16 PROCEDURE — C1713: CPT

## 2024-04-16 PROCEDURE — 97162 PT EVAL MOD COMPLEX 30 MIN: CPT

## 2024-04-16 PROCEDURE — 82962 GLUCOSE BLOOD TEST: CPT

## 2024-04-16 PROCEDURE — 73501 X-RAY EXAM HIP UNI 1 VIEW: CPT

## 2024-04-16 PROCEDURE — 97165 OT EVAL LOW COMPLEX 30 MIN: CPT

## 2024-04-16 RX ORDER — TRAMADOL HYDROCHLORIDE 50 MG/1
1 TABLET ORAL
Qty: 28 | Refills: 0
Start: 2024-04-16 | End: 2024-04-22

## 2024-04-16 RX ORDER — SENNA PLUS 8.6 MG/1
2 TABLET ORAL
Qty: 14 | Refills: 0
Start: 2024-04-16 | End: 2024-04-22

## 2024-04-16 RX ORDER — GLIMEPIRIDE 1 MG
1 TABLET ORAL
Refills: 0 | DISCHARGE

## 2024-04-16 RX ORDER — ASPIRIN/CALCIUM CARB/MAGNESIUM 324 MG
1 TABLET ORAL
Qty: 60 | Refills: 0
Start: 2024-04-16 | End: 2024-05-15

## 2024-04-16 RX ADMIN — Medication 650 MILLIGRAM(S): at 05:25

## 2024-04-16 RX ADMIN — Medication 650 MILLIGRAM(S): at 05:30

## 2024-04-16 RX ADMIN — Medication 4: at 07:54

## 2024-04-16 RX ADMIN — HYDROMORPHONE HYDROCHLORIDE 0.5 MILLIGRAM(S): 2 INJECTION INTRAMUSCULAR; INTRAVENOUS; SUBCUTANEOUS at 11:30

## 2024-04-16 RX ADMIN — HYDROMORPHONE HYDROCHLORIDE 0.5 MILLIGRAM(S): 2 INJECTION INTRAMUSCULAR; INTRAVENOUS; SUBCUTANEOUS at 04:37

## 2024-04-16 RX ADMIN — Medication 4: at 12:15

## 2024-04-16 RX ADMIN — HYDROMORPHONE HYDROCHLORIDE 0.5 MILLIGRAM(S): 2 INJECTION INTRAMUSCULAR; INTRAVENOUS; SUBCUTANEOUS at 10:30

## 2024-04-16 RX ADMIN — CELECOXIB 200 MILLIGRAM(S): 200 CAPSULE ORAL at 05:25

## 2024-04-16 RX ADMIN — Medication 650 MILLIGRAM(S): at 01:02

## 2024-04-16 RX ADMIN — Medication 400 MILLIGRAM(S): at 02:50

## 2024-04-16 RX ADMIN — HYDROMORPHONE HYDROCHLORIDE 0.5 MILLIGRAM(S): 2 INJECTION INTRAMUSCULAR; INTRAVENOUS; SUBCUTANEOUS at 05:30

## 2024-04-16 RX ADMIN — Medication 1000 MILLIGRAM(S): at 03:00

## 2024-04-16 RX ADMIN — CELECOXIB 200 MILLIGRAM(S): 200 CAPSULE ORAL at 05:30

## 2024-04-16 RX ADMIN — Medication 650 MILLIGRAM(S): at 01:45

## 2024-04-16 RX ADMIN — Medication 325 MILLIGRAM(S): at 05:25

## 2024-04-16 NOTE — PROGRESS NOTE ADULT - SUBJECTIVE AND OBJECTIVE BOX
Orthopaedic PA    Procedure: s/p L URMILA  Surgeon: Eli    63y Female comfortable, without complaints.     Denies chest pain, shortness of breath, palpitations, nausea, vomiting, dizziness, fevers, chills    PE:  Vital Signs Last 24 Hrs  T(C): 36.6 (15 Apr 2024 13:41), Max: 37 (15 Apr 2024 07:51)  T(F): 97.8 (15 Apr 2024 13:41), Max: 98.6 (15 Apr 2024 07:51)  HR: 76 (15 Apr 2024 13:41) (71 - 97)  BP: 116/77 (15 Apr 2024 13:41) (116/64 - 146/69)  BP(mean): --  RR: 16 (15 Apr 2024 13:41) (12 - 16)  SpO2: 94% (15 Apr 2024 13:41) (94% - 100%)    Parameters below as of 15 Apr 2024 13:41  Patient On (Oxygen Delivery Method): nasal cannula      General:  A + O x 3 in NAD    hip: Aquacel  Dressing: C/D/I     Lower Extremity Exam:         5/5 Tibialis Anterior ( dorsiflexion )      5/5 Gastrocsoleus ( plantarflexion )      5/5 Extensor Hallucis Longus ( toe extension )      5/5  Flexor Hallucis Longus ( toe flexion)            Sensation intact to Light touch L2-S1    +2 DP/PT Pulses  Compartments soft and compressible  No calf tenderness bilaterally                          14.0   12.57 )-----------( 359      ( 15 Apr 2024 12:45 )             43.2       04-15    138  |  108  |  20  ----------------------------<  324<H>  5.0   |  26  |  0.81    Ca    8.2<L>      15 Apr 2024 12:45          A/P: 63y Female stable POD#0 s/p  L URMILA    - Pain control   - Incentive spirometer  - DVT ppx: SCD / Chemical  mg twice a day POD 1   - Ambulation as tolerated  - PT, OT  - F/U AM Labs  - Discharge planning home with home care         
Orthopaedic PA    Procedure: s/p  L URMILA  Surgeon: Eli    63y Female comfortable, without complaints.     Denies chest pain, shortness of breath, palpitations, nausea, vomiting, dizziness, fevers, chills    PE:  Vital Signs Last 24 Hrs  T(C): 37.1 (16 Apr 2024 04:29), Max: 37.1 (16 Apr 2024 04:29)  T(F): 98.7 (16 Apr 2024 04:29), Max: 98.7 (16 Apr 2024 04:29)  HR: 111 (16 Apr 2024 05:46) (71 - 118)  BP: 125/78 (16 Apr 2024 04:29) (107/67 - 146/69)  BP(mean): --  RR: 22 (16 Apr 2024 04:29) (12 - 22)  SpO2: 93% (16 Apr 2024 04:29) (91% - 100%)    Parameters below as of 16 Apr 2024 04:29  Patient On (Oxygen Delivery Method): room air      General:  A + O x 3 in NAD    Left Hip: Aquacel  Dressing: C/D/I     Left Lower Extremity Exam:         5/5 Tibialis Anterior ( dorsiflexion )      5/5 Gastrocsoleus ( plantarflexion )      5/5 Extensor Hallucis Longus ( toe extension )      5/5  Flexor Hallucis Longus ( toe flexion)            Sensation intact to Light touch S/Sa/SP/DP/T    +2 DP/PT Pulses  Compartments soft and compressible  No calf tenderness bilaterally                          13.5   10.44 )-----------( 339      ( 16 Apr 2024 06:15 )             41.3       04-16    142  |  108  |  14  ----------------------------<  211<H>  4.1   |  24  |  0.87    Ca    9.1      16 Apr 2024 06:15          A/P: 63y Female stable POD#1 s/p L URMILA      - Pain control   - Incentive spirometer  - DVT ppx: SCD / ASA   - Ambulation as tolerated  - PT, OT  - Discharge planning - home with home care

## 2024-04-16 NOTE — ASU DISCHARGE PLAN (ADULT/PEDIATRIC) - CARE PROVIDER_API CALL
Antonio Benitez  Orthopaedic Surgery  [address] [place] [id number]  Phone: [phone]  Fax: [phone]  Follow Up Time:

## 2024-04-16 NOTE — DISCHARGE NOTE NURSING/CASE MANAGEMENT/SOCIAL WORK - NSPROEXTENSIONSOFSELF_GEN_A_NUR
walker Subsequent Stages Histo Method Verbiage: Using a similar technique to that described above, a thin layer of tissue was removed from all areas where tumor was visible on the previous stage.  The tissue was again oriented, mapped, dyed, and processed as above.

## 2024-04-16 NOTE — ASU DISCHARGE PLAN (ADULT/PEDIATRIC) - ASU DC SPECIAL INSTRUCTIONSFT
Weight Bearing As Tolerated  Aspirin 325 mg twice a day for 30 days   Follow Up With Dr. Benitez In 2 Weeks   Call [phone] For An Appointment.  Keep Knee Aquacel  Dressing  On Dry And Clean, It Will Be Changed Or Removed On Your Next Office Visit.

## 2024-04-16 NOTE — PHARMACOTHERAPY INTERVENTION NOTE - NSPHARMCOMMPTEDUFT
Orthopedics Counseling
Nasal mucosa clear.  Mouth with normal mucosa  Throat has no vesicles, no oropharyngeal exudates and uvula is midline.

## 2024-04-16 NOTE — DISCHARGE NOTE NURSING/CASE MANAGEMENT/SOCIAL WORK - PATIENT PORTAL LINK FT
You can access the FollowMyHealth Patient Portal offered by [facility] by registering at the following website: [email or web address]. By joining [employer]’s FollowMyHealth portal, you will also be able to view your health information using other applications (apps) compatible with our system.

## 2024-04-16 NOTE — PHARMACOTHERAPY INTERVENTION NOTE - COMMENTS
Orthopedics Counseling    Discussed current and likely discharge medication list with patient including indication, directions, and adverse effects to monitor.    Counseling materials provided/counseling aids used: 1 pager  Time spent Counseling: 15 minutes
independent

## 2024-04-16 NOTE — CASE MANAGEMENT PROGRESS NOTE - NSCMPROGRESSNOTE_GEN_ALL_CORE
Patient s/p left hip replacement. PT recommending home PT. Spoke with the patient at the bedside. Patient would like home care services of PT with [facility]. Referral sent to [facility] home care requesting SOC 4/17/24. Will remain available to patient throughout hospital stay

## 2024-04-16 NOTE — OCCUPATIONAL THERAPY INITIAL EVALUATION ADULT - ADDITIONAL COMMENTS
Pt lives with her mother in an apartment, no steps. Bathroom has a tub with grab bars and raised toilet with arms. PTA pt was independent with ADLs/IADLs and functional mobility without AD. Pt reports her mother has an aide who can assist with IADLs.

## 2024-04-16 NOTE — OCCUPATIONAL THERAPY INITIAL EVALUATION ADULT - NSOTDISCHREC_GEN_A_CORE
rec pt sponge bathe initially until LLE strength improves for safe tub transfers with HCOT- pt verb good understanding/Home OT

## 2024-04-16 NOTE — DISCHARGE NOTE NURSING/CASE MANAGEMENT/SOCIAL WORK - NSDCPEFALRISK_GEN_ALL_CORE
For information on Fall & Injury Prevention, visit: [email or web address] OR  [email or web address] OR  https://www.cdc.gov/steadi/patient.html

## 2024-04-16 NOTE — PATIENT CHOICE NOTE. - NSPOSTACUTEPROV_GEN_ALL_CORE
Knee injuries-possible cartilage damage.  Follow-up with orthopedics-Dr. Quiles.  Call if problems.   Home Care 24-Nov-2017 11:09

## 2024-05-21 NOTE — H&P PST ADULT - GAIT/BALANCE
----- Message from Paris Díaz sent at 5/20/2024  4:41 PM CDT -----  Type:  Appointment Request     Name of Caller:VANESSA SILVEIRA [[id number]]  When is the first available appointment?No access  Symptoms:unknown  Would the patient rather a call back or a response via [facility]? call  Best Call Back Number:[phone]   Additional Information: Patient would like to schedule an appt with provider as soon as possible as they are having a concern. Please contact patient as soon as possible regarding an appointment availability.   antalgic

## 2024-05-25 ENCOUNTER — EMERGENCY (EMERGENCY)
Facility: HOSPITAL | Age: 64
LOS: 1 days | Discharge: HOME | End: 2024-05-25
Attending: EMERGENCY MEDICINE | Admitting: EMERGENCY MEDICINE
Payer: COMMERCIAL

## 2024-05-25 VITALS
OXYGEN SATURATION: 98 % | DIASTOLIC BLOOD PRESSURE: 85 MMHG | HEART RATE: 88 BPM | SYSTOLIC BLOOD PRESSURE: 132 MMHG | RESPIRATION RATE: 19 BRPM

## 2024-05-25 VITALS
OXYGEN SATURATION: 95 % | SYSTOLIC BLOOD PRESSURE: 142 MMHG | WEIGHT: 199.96 LBS | HEIGHT: 63.6 IN | HEART RATE: 116 BPM | TEMPERATURE: 98 F | RESPIRATION RATE: 16 BRPM | DIASTOLIC BLOOD PRESSURE: 92 MMHG

## 2024-05-25 DIAGNOSIS — Z90.89 ACQUIRED ABSENCE OF OTHER ORGANS: Chronic | ICD-10-CM

## 2024-05-25 DIAGNOSIS — Z98.890 OTHER SPECIFIED POSTPROCEDURAL STATES: Chronic | ICD-10-CM

## 2024-05-25 DIAGNOSIS — Z90.49 ACQUIRED ABSENCE OF OTHER SPECIFIED PARTS OF DIGESTIVE TRACT: Chronic | ICD-10-CM

## 2024-05-25 PROCEDURE — 99284 EMERGENCY DEPT VISIT MOD MDM: CPT | Mod: 25

## 2024-05-25 PROCEDURE — 96372 THER/PROPH/DIAG INJ SC/IM: CPT | Mod: XU

## 2024-05-25 PROCEDURE — 99285 EMERGENCY DEPT VISIT HI MDM: CPT

## 2024-05-25 PROCEDURE — 72131 CT LUMBAR SPINE W/O DYE: CPT | Mod: 26,MC

## 2024-05-25 PROCEDURE — 72131 CT LUMBAR SPINE W/O DYE: CPT | Mod: MC

## 2024-05-25 RX ORDER — LIDOCAINE 4 G/100G
1 CREAM TOPICAL ONCE
Refills: 0 | Status: COMPLETED | OUTPATIENT
Start: 2024-05-25 | End: 2024-05-25

## 2024-05-25 RX ORDER — MORPHINE SULFATE 50 MG/1
30 CAPSULE, EXTENDED RELEASE ORAL ONCE
Refills: 0 | Status: DISCONTINUED | OUTPATIENT
Start: 2024-05-25 | End: 2024-05-25

## 2024-05-25 RX ORDER — CYCLOBENZAPRINE HYDROCHLORIDE 10 MG/1
10 TABLET, FILM COATED ORAL ONCE
Refills: 0 | Status: COMPLETED | OUTPATIENT
Start: 2024-05-25 | End: 2024-05-25

## 2024-05-25 RX ORDER — MORPHINE SULFATE 50 MG/1
8 CAPSULE, EXTENDED RELEASE ORAL ONCE
Refills: 0 | Status: DISCONTINUED | OUTPATIENT
Start: 2024-05-25 | End: 2024-05-25

## 2024-05-25 RX ORDER — MORPHINE SULFATE 50 MG/1
1 CAPSULE, EXTENDED RELEASE ORAL
Qty: 20 | Refills: 0
Start: 2024-05-25 | End: 2024-05-29

## 2024-05-25 RX ORDER — CYCLOBENZAPRINE HYDROCHLORIDE 10 MG/1
1 TABLET, FILM COATED ORAL
Qty: 15 | Refills: 0
Start: 2024-05-25 | End: 2024-05-29

## 2024-05-25 RX ORDER — KETOROLAC TROMETHAMINE 30 MG/ML
30 SYRINGE (ML) INJECTION ONCE
Refills: 0 | Status: DISCONTINUED | OUTPATIENT
Start: 2024-05-25 | End: 2024-05-25

## 2024-05-25 RX ADMIN — LIDOCAINE 1 PATCH: 4 CREAM TOPICAL at 18:01

## 2024-05-25 RX ADMIN — LIDOCAINE 1 PATCH: 4 CREAM TOPICAL at 20:13

## 2024-05-25 RX ADMIN — Medication 30 MILLIGRAM(S): at 18:35

## 2024-05-25 RX ADMIN — MORPHINE SULFATE 8 MILLIGRAM(S): 50 CAPSULE, EXTENDED RELEASE ORAL at 20:13

## 2024-05-25 RX ADMIN — Medication 30 MILLIGRAM(S): at 18:01

## 2024-05-25 RX ADMIN — CYCLOBENZAPRINE HYDROCHLORIDE 10 MILLIGRAM(S): 10 TABLET, FILM COATED ORAL at 18:00

## 2024-05-25 RX ADMIN — MORPHINE SULFATE 8 MILLIGRAM(S): 50 CAPSULE, EXTENDED RELEASE ORAL at 19:18

## 2024-05-25 NOTE — ED PROVIDER NOTE - NSFOLLOWUPINSTRUCTIONS_ED_ALL_ED_FT
Follow-up with orthopedic spine, call on Tuesday to make an appointment.    Lumbar radiculopathy is a painful condition that happens when a nerve in your lumbar spine (lower back) is pinched or irritated.  Vertebral Column    What causes lumbar radiculopathy? You may get a pinched nerve in your lumbar spine if you have disc damage. Discs are natural, spongy cushions between your vertebrae (back bones) that allow your spine to move. Your discs may move out of place and pinch the nerve in your spine. Any of the following can increase your risk for a pinched nerve and lumbar radiculopathy:    Diabetes, a spinal infection, or a growth in your spine    Extra body weight    Being male or an older adult    Cigarette use  What are the signs and symptoms of lumbar radiculopathy? You may have any of the following:    Pain that moves from your lower back to your buttocks, groin, and the back of your leg    Pain felt below your knee    Pain that worsens when you cough, sneeze, stand, or sit    Numbness, weakness, or tingling in your back or legs  How is lumbar radiculopathy diagnosed? Your healthcare provider will examine you and ask about your family history of back and leg pain. Your provider may also test you for weakness, numbness, or tingling in your back, buttocks, and legs. You may be asked to lie on your back and lift your leg to locate your pain. You may also need any of the following:    MRI or CT scan pictures may show problems or changes in your back bones, nerves, and discs. Contrast liquid may be used to help problems show up better in the pictures. Tell the healthcare provider if you have ever had an allergic reaction to contrast liquid. Do not enter the MRI room with anything metal. Metal can cause serious injury. Tell the healthcare provider if you have any metal in or on your body.    X-ray pictures show signs of infection or other problems with your spine.    An electromyography (EMG) test measures the electrical activity of your muscles at rest and with movement.  How is lumbar radiculopathy treated? Ask your healthcare provider for more information about these and other treatments for lumbar radiculopathy:    Medicines:  NSAIDs, such as ibuprofen, help decrease swelling, pain, and fever. This medicine is available with or without a doctor's order. NSAIDs can cause stomach bleeding or kidney problems in certain people. If you take blood thinner medicine, always ask your healthcare provider if NSAIDs are safe for you. Always read the medicine label and follow directions.    Muscle relaxers help decrease pain and muscle spasms.    Prescription pain medicine may be given. Ask your healthcare provider how to take this medicine safely. Some prescription pain medicines contain acetaminophen. Do not take other medicines that contain acetaminophen without talking to your healthcare provider. Too much acetaminophen may cause liver damage. Prescription pain medicine may cause constipation. Ask your healthcare provider how to prevent or treat constipation.    Steroid pills may help reduce swelling and pain.    Steroid injections into your lumbar spine may help decrease your nerve pain and swelling. You may need more than 1 injection if your symptoms do not improve after the first treatment.    Physical therapy may be used to improve your posture (the way you stand and sit), flexibility, and the strength in your lower back. Your physical therapist may also teach you how to remain safely active and prevent more injury.    Transcutaneous electrical nerve stimulation (TENS) stimulates nerves and may decrease your pain. Wires are attached to pads. The pads are attached to your skin. The wires send a mild current through your nerves.    Surgery may relieve your pinched nerve if your condition has not improved within 4 to 6 weeks. You may also need surgery if you have lumbar radiculopathy more than 1 time.  What can I do to manage or prevent lumbar radiculopathy?    Apply ice or heat. Ice and heat can help relieve pain or swelling. Put ice in a plastic bag covered with a towel on your low back. Apply ice for 15 to 20 minutes at a time, or as directed. Cover heated items with a towel to avoid burns. You can also use a heating pad on a low setting. Apply heat for 20 minutes at a time. Your provider may tell you to alternate ice and heat.    Stay active. Your healthcare provider may tell you to take walks to ease yourself back into your daily routine. Avoid long periods of bed rest. Bed rest could worsen your symptoms. Do not move in ways that increase your pain. Ask your healthcare provider for more information about the best ways to stay active.  Black Family Walking for Exercise      Do not lift anything heavy. Heavy objects put a strain on your back and may make your symptoms worse.    Maintain a healthy weight. Extra body weight may strain your back. Ask your provider what a healthy weight is for you. Your provider can help you create a safe weight loss plan, if needed.    Do not smoke. Nicotine and other chemicals in cigarettes and cigars increase your risk for lumbar radiculopathy. Ask your provider for information if you currently smoke and need help to quit. E-cigarettes and smokeless tobacco still contain nicotine. Talk to your healthcare provider before you use these products.  When should I seek immediate care?    You have a fever greater than 100.4°F (38°C) for longer than 2 days.    You have new, severe back or leg pain, or your pain spreads to both legs.    You have any new signs of numbness or weakness, especially in your lower back, legs, arms, or genital area.    You have new trouble controlling your urine and bowel movements.    You do not feel like your bladder empties when you urinate.  When should I call my doctor?    Your pain does not improve within 1 to 3 weeks of treatment.    Your pain and weakness keep you from your normal activities at work, home, or school.    You lose more than 10 pounds in 6 months without trying.    You become depressed or sad because of the pain.    You have questions or concerns about your condition or care.  CARE AGREEMENT: Follow-up with orthopedic spine, call on Tuesday to make an appointment. Stop taking tramadol, take morphine 15 mg orally every 6 hours as needed for pain.  Take naproxen 500 mg orally twice daily as needed for pain.  Take Flexeril muscle relaxer as directed.  May continue taking your steroid Medrol Dosepak as prescribed by your physician.    Lumbar radiculopathy is a painful condition that happens when a nerve in your lumbar spine (lower back) is pinched or irritated.  Vertebral Column    What causes lumbar radiculopathy? You may get a pinched nerve in your lumbar spine if you have disc damage. Discs are natural, spongy cushions between your vertebrae (back bones) that allow your spine to move. Your discs may move out of place and pinch the nerve in your spine. Any of the following can increase your risk for a pinched nerve and lumbar radiculopathy:    Diabetes, a spinal infection, or a growth in your spine    Extra body weight    Being male or an older adult    Cigarette use  What are the signs and symptoms of lumbar radiculopathy? You may have any of the following:    Pain that moves from your lower back to your buttocks, groin, and the back of your leg    Pain felt below your knee    Pain that worsens when you cough, sneeze, stand, or sit    Numbness, weakness, or tingling in your back or legs  How is lumbar radiculopathy diagnosed? Your healthcare provider will examine you and ask about your family history of back and leg pain. Your provider may also test you for weakness, numbness, or tingling in your back, buttocks, and legs. You may be asked to lie on your back and lift your leg to locate your pain. You may also need any of the following:    MRI or CT scan pictures may show problems or changes in your back bones, nerves, and discs. Contrast liquid may be used to help problems show up better in the pictures. Tell the healthcare provider if you have ever had an allergic reaction to contrast liquid. Do not enter the MRI room with anything metal. Metal can cause serious injury. Tell the healthcare provider if you have any metal in or on your body.    X-ray pictures show signs of infection or other problems with your spine.    An electromyography (EMG) test measures the electrical activity of your muscles at rest and with movement.  How is lumbar radiculopathy treated? Ask your healthcare provider for more information about these and other treatments for lumbar radiculopathy:    Medicines:  NSAIDs, such as ibuprofen, help decrease swelling, pain, and fever. This medicine is available with or without a doctor's order. NSAIDs can cause stomach bleeding or kidney problems in certain people. If you take blood thinner medicine, always ask your healthcare provider if NSAIDs are safe for you. Always read the medicine label and follow directions.    Muscle relaxers help decrease pain and muscle spasms.    Prescription pain medicine may be given. Ask your healthcare provider how to take this medicine safely. Some prescription pain medicines contain acetaminophen. Do not take other medicines that contain acetaminophen without talking to your healthcare provider. Too much acetaminophen may cause liver damage. Prescription pain medicine may cause constipation. Ask your healthcare provider how to prevent or treat constipation.    Steroid pills may help reduce swelling and pain.    Steroid injections into your lumbar spine may help decrease your nerve pain and swelling. You may need more than 1 injection if your symptoms do not improve after the first treatment.    Physical therapy may be used to improve your posture (the way you stand and sit), flexibility, and the strength in your lower back. Your physical therapist may also teach you how to remain safely active and prevent more injury.    Transcutaneous electrical nerve stimulation (TENS) stimulates nerves and may decrease your pain. Wires are attached to pads. The pads are attached to your skin. The wires send a mild current through your nerves.    Surgery may relieve your pinched nerve if your condition has not improved within 4 to 6 weeks. You may also need surgery if you have lumbar radiculopathy more than 1 time.  What can I do to manage or prevent lumbar radiculopathy?    Apply ice or heat. Ice and heat can help relieve pain or swelling. Put ice in a plastic bag covered with a towel on your low back. Apply ice for 15 to 20 minutes at a time, or as directed. Cover heated items with a towel to avoid burns. You can also use a heating pad on a low setting. Apply heat for 20 minutes at a time. Your provider may tell you to alternate ice and heat.    Stay active. Your healthcare provider may tell you to take walks to ease yourself back into your daily routine. Avoid long periods of bed rest. Bed rest could worsen your symptoms. Do not move in ways that increase your pain. Ask your healthcare provider for more information about the best ways to stay active.  Black Family Walking for Exercise      Do not lift anything heavy. Heavy objects put a strain on your back and may make your symptoms worse.    Maintain a healthy weight. Extra body weight may strain your back. Ask your provider what a healthy weight is for you. Your provider can help you create a safe weight loss plan, if needed.    Do not smoke. Nicotine and other chemicals in cigarettes and cigars increase your risk for lumbar radiculopathy. Ask your provider for information if you currently smoke and need help to quit. E-cigarettes and smokeless tobacco still contain nicotine. Talk to your healthcare provider before you use these products.  When should I seek immediate care?    You have a fever greater than 100.4°F (38°C) for longer than 2 days.    You have new, severe back or leg pain, or your pain spreads to both legs.    You have any new signs of numbness or weakness, especially in your lower back, legs, arms, or genital area.    You have new trouble controlling your urine and bowel movements.    You do not feel like your bladder empties when you urinate.  When should I call my doctor?    Your pain does not improve within 1 to 3 weeks of treatment.    Your pain and weakness keep you from your normal activities at work, home, or school.    You lose more than 10 pounds in 6 months without trying.    You become depressed or sad because of the pain.    You have questions or concerns about your condition or care.  CARE AGREEMENT:

## 2024-05-25 NOTE — ED PROVIDER NOTE - PATIENT PORTAL LINK FT
You can access the FollowMyHealth Patient Portal offered by [facility] by registering at the following website: [email or web address]. By joining [facility]’s FollowMyHealth portal, you will also be able to view your health information using other applications (apps) compatible with our system.

## 2024-05-25 NOTE — ED ADULT NURSE NOTE - NSFALLRISKINTERV_ED_ALL_ED

## 2024-05-25 NOTE — ED PROVIDER NOTE - CARE PROVIDER_API CALL
Alfredo Ahuja  Orthopaedic Surgery  [address]  [place] [address]  Phone: [phone]  Fax: [phone]  Follow Up Time:

## 2024-05-25 NOTE — ED PROVIDER NOTE - CLINICAL SUMMARY MEDICAL DECISION MAKING FREE TEXT BOX
Medical History        Past Medical History:   Diagnosis Date    Anxiety      Arthritis      Asthma      Bipolar I disorder, most recent episode (or current) depressed, unspecified 9/12/2014    Clostridium difficile infection      COPD (chronic obstructive pulmonary disease) (HCC)      Depression      Disease of blood and blood forming organ      Eczema      Fracture, metacarpal       R 4th and 5th    Gastric ulcer      Gastritis 06/13/2018    GERD (gastroesophageal reflux disease)      GI bleed      H. 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[address]      Substance abuse      Suicidal ideation      Suicide attempt by hanging ([facility] [address]      Tobacco dependence      Ulcerative colitis ([facility] [address]      UTI (urinary tract infection)              Pt denies any history of Diabetes mellitus type 2, hypertension, stroke, heart disease, COPD, Asthma, GERD, HLD, Cancer, Seizures,Thyroid disease, Kidney Disease, Hepatitis, TB,      SURGICAL HISTORY        Past Surgical History         Past Surgical History:   Procedure Laterality Date    ABDOMEN SURGERY         upper GI scope 7/7/2015    BRONCHOSCOPY        CHOLECYSTECTOMY, LAPAROSCOPIC   07/14/2017     surgery performed at [address] [place] [address]         lumbar puncture    CO COLONOSCOPY W/BIOPSY SINGLE/MULTIPLE   8/9/2017     COLONOSCOPY WITH BIOPSY performed by Nathan Villarreal MD at [facility]    CO EGD TRANSORAL BIOPSY SINGLE/MULTIPLE N/A 3/20/2017     EGD BIOPSY performed by Brennon Casiano MD at [facility]    CO ESOPHAGOGASTRODUODENOSCOPY TRANSORAL DIAGNOSTIC N/A 8/9/2017     EGD ESOPHAGOGASTRODUODENOSCOPY performed by Wes Mitchell MD at [facility]    SIGMOIDOSCOPY N/A 3/20/2017     SIGMOIDOSCOPY DIAGNOSTIC FLEXIBLE performed by Karen Isaac MD at [facility]    UPPER GASTROINTESTINAL ENDOSCOPY   2/4/16    UPPER GASTROINTESTINAL ENDOSCOPY N/A 6/13/2018     GASTRITIS            FAMILY HISTORY        Family History         Family History   Problem Relation Age of Onset    Diabetes Father      Alcohol Abuse Father      Depression Father      Arthritis Father      High Blood Pressure Father      Other Father           aneurysm & epilepsy    Migraines Father      Arthritis Mother      Other Mother           aneurysm & epilepsy    Migraines Mother      Diabetes Brother           Aunt and uncles    Depression Brother      Mental Illness Brother      Other Brother           epilepsy    Migraines Brother      Stroke Other           Uncle    Other Brother           murdered Oct 6th, 2014    Colon Cancer Paternal Cousin 43    Other Sister           epilepsy    Migraines Sister              SOCIAL HISTORY        Social History   Social History            Social History    Marital status: Single       Spouse name: N/A    Number of children: N/A    Years of education: N/A           Occupational History    disability               Social History Main Topics    Smoking status: Current Some Day Smoker       Packs/day: 0.50       Years: 5.00       Types: E-Cigarettes, Cigarettes       Last attempt to quit: 6/2/2016    Smokeless tobacco: Never Used    Alcohol use No    Drug use:  Yes         Comment: hx methadone, pt denies h/o of drug use    Sexual activity: Yes       Partners: Female         Comment: Lives alone, not working.           Other Topics Concern    None          Social History Narrative     ** Merged History Encounter **                     REVIEW OF SYSTEMS             Allergies   Allergen Reactions    Geodon [Ziprasidone Hcl] Anaphylaxis    Haldol [Haloperidol] Epigastric pain        Wendy Garrido, APRN - CNP on 7/1/2018 at 12:50 PM 63-year-old female with right-sided lumbar sciatica-like pain, will follow-up CT lumbar, pain control and reevaluate.

## 2024-05-25 NOTE — ED PROVIDER NOTE - PROGRESS NOTE DETAILS
Patient feeling better, CT scan results discussed with the patient, agrees to follow-up with orthopedics, states that she wants to go to [facility] as she has been there before, patient states that her physician had prescribed a new Medrol Dosepak which she will start tomorrow, will stop tramadol, will get new prescription for Flexeril and morphine, understands to return to the emergency department if having fever, loss of bowel or bladder function and or worsening of symptoms.
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
## 2024-05-25 NOTE — ED ADULT NURSE NOTE - OBJECTIVE STATEMENT
Pt. received alert and oriented x3 with chief complaint of sudden onset of right lower back pain radiating down right leg.

## 2024-08-22 NOTE — H&P PST ADULT - NSCAFFEINETYPE_GEN_ALL_CORE_SD
Also an issue during her previous visit. Previously received antibiotics for this. CT currently without acute abnormalities.   ENT recommendations appreciated:  Acute sialoadenitis and associated neck edema.  Sialoadenitis completely resolved.  Mild residual edema still present.    To avoid recurrent episodes of sialoadenitis hydration and use of sialagogues (lemon drops, sour candy) is recommended.    Incidental finding on physical examination of focal periodontitis and a tooth that may need to be extracted.  Recommend a follow-up with dentist.  This can be done as an outpatient.   coffee

## 2024-10-02 NOTE — H&P PST ADULT - NSANTHOSAYNRD_GEN_A_CORE
No. MEG screening performed.  STOP BANG Legend: 0-2 = LOW Risk; 3-4 = INTERMEDIATE Risk; 5-8 = HIGH Risk DISCHARGE

## 2025-03-20 NOTE — CONSULT NOTE ADULT - NEGATIVE CARDIOVASCULAR SYMPTOMS
Daily Note     Today's date: 3/20/2025  Patient name: Izabela Moeller  : 1949  MRN: [id number]  Referring provider: Marcie Parson PA-C  Dx:   Encounter Diagnosis     ICD-10-CM    1. Benign meningioma (HCC)  D32.9       2. Right foot drop  M21.371       3. Right arm weakness  R29.898                        Subjective: Patient reports that she is doing well. Brace appointment is on Monday.        Objective: See treatment diary below    NMR in // bars:  - Resisted walking with purple band around ankles 3 laps with RW ( 450 feet)   - March with, 3s iso, 1UE support 4 laps, 3# ankle weight BLE   - Mod tandem amb with 1UE assist 4 laps  - STS with 10#tidal tank   - high knee  with 1 foot on 6 inch step with base of floor to top of 2nd step ( total height 12 inches)   - 1.2 5% incline x 10 mins-     Manual:  Ankle mob with 12 inch step with PT resistance 5 minutes       Assessment: Tolerated treatment well.   Patient participated in skilled PT session focused on balance, gait, and endurance.   Patient continues with decreased R ankle DF causing for compensation during activities.  Patient experienced no loss fo balance during exercises. Patient would continue to benefit from skilled PT interventions to address deficits with balance, gait, and endurance. Patient demonstrated fatigue post treatment      Plan: Continue per plan of care.        POC expires Unit limit Auth Expiration date PT/OT + Visit Limit? Co-Insurance   3/30/25 N/a pend BOMN Yes                               Visit/Unit Tracking  - PN 2/ - PN 3/4 3/6 3/11 3/13 3/18 3/20                               no chest pain/no palpitations/no dyspnea on exertion

## 2025-04-16 NOTE — H&P PST ADULT - PATIENT'S SEXUAL ORIENTATION
Chief Complaint   Patient presents with    Annual Exam     \"Have you been to the ER, urgent care clinic since your last visit?  Hospitalized since your last visit?\"    NO    “Have you seen or consulted any other health care providers outside of [facility] since your last visit?”    NO            Click Here for Release of Records Request   PHQ-9 Total Score: 6 (4/16/2025 10:44 AM)  Thoughts that you would be better off dead, or of hurting yourself in some way: 0 (4/16/2025 10:44 AM)         
Well Adult Note  Name: Salome Faria Today’s Date: 2025   MRN: [id number] Sex: Female   Age: 56 y.o. Ethnicity: Declined   : 1968 Race:  / [place] Native      Salome Faria is here for a well adult exam.          Assessment & Plan  1. Health maintenance.  - Weight has remained stable since the last visit, with a slight decrease compared to the previous year.  - Last Pap smear was conducted in , and she is due for another in . Colonoscopy was performed in , and she is not due for another until . Most recent mammogram, conducted yesterday, yielded normal results.  - Due for COVID-19, pneumonia, and shingles vaccines but has declined them after understanding the risks of not vaccinating.    2. Depression.  - Depression screening returned positive results today, which she attributes to perimenopause.  - Reports no suicidal ideation or thoughts of harming others.  - Not interested in medication management at this time and prefers to work through the depression.    3. Mild intermittent asthma.  - Using albuterol as needed, especially with seasonal changes.    4. Allergies.  - Not taking any medication for her allergies because she forgets to take it at night.    5. Low back pain.  - Experiencing low back pain for the past month, previously treated with antibiotics under the assumption of a urinary tract infection (UTI), though the presence of a UTI was not confirmed.  - Urinalysis today revealed hematuria, which could be indicative of a UTI. Reports increased urinary frequency but no other urinary symptoms.  - A urine culture will be ordered to confirm the presence of a UTI. Meanwhile, will be treated with Bactrim 1 tablet twice daily for 3 days. A prescription for Flexeril 5 mg, 30 tablets, will be sent to [facility] in [place]. Advised against consuming alcohol or other sedative medications while on Flexeril.  - A follow-up clinical visit will be scheduled in 2 weeks to ensure the 
Heterosexual

## 2025-07-09 ENCOUNTER — EMERGENCY (EMERGENCY)
Facility: HOSPITAL | Age: 65
LOS: 1 days | End: 2025-07-09
Attending: EMERGENCY MEDICINE | Admitting: EMERGENCY MEDICINE
Payer: COMMERCIAL

## 2025-07-09 VITALS
DIASTOLIC BLOOD PRESSURE: 79 MMHG | SYSTOLIC BLOOD PRESSURE: 146 MMHG | OXYGEN SATURATION: 95 % | HEART RATE: 99 BPM | RESPIRATION RATE: 18 BRPM | HEIGHT: 65 IN | WEIGHT: 229.94 LBS | TEMPERATURE: 98 F

## 2025-07-09 VITALS
SYSTOLIC BLOOD PRESSURE: 148 MMHG | OXYGEN SATURATION: 95 % | DIASTOLIC BLOOD PRESSURE: 88 MMHG | HEART RATE: 98 BPM | TEMPERATURE: 99 F | RESPIRATION RATE: 18 BRPM

## 2025-07-09 DIAGNOSIS — Z90.89 ACQUIRED ABSENCE OF OTHER ORGANS: Chronic | ICD-10-CM

## 2025-07-09 DIAGNOSIS — Z98.890 OTHER SPECIFIED POSTPROCEDURAL STATES: Chronic | ICD-10-CM

## 2025-07-09 DIAGNOSIS — Z90.49 ACQUIRED ABSENCE OF OTHER SPECIFIED PARTS OF DIGESTIVE TRACT: Chronic | ICD-10-CM

## 2025-07-09 PROCEDURE — 93970 EXTREMITY STUDY: CPT | Mod: 26

## 2025-07-09 PROCEDURE — 99284 EMERGENCY DEPT VISIT MOD MDM: CPT | Mod: 25

## 2025-07-09 PROCEDURE — 93970 EXTREMITY STUDY: CPT

## 2025-07-09 PROCEDURE — 99284 EMERGENCY DEPT VISIT MOD MDM: CPT

## (undated) DEVICE — SOL IRR BAG NS 0.9% 3000ML

## (undated) DEVICE — POSITIONER PAD HIP

## (undated) DEVICE — DRSG COBAN 4"

## (undated) DEVICE — DRILL BIT J&J DEPUY MITEK 1.8 MM

## (undated) DEVICE — GOWN XL EXTRA LONG

## (undated) DEVICE — SUT VLOC 180 3-0 18" P-12 UNDYED

## (undated) DEVICE — SUT BIOSYN 2-0 30" C-14 UNDYED

## (undated) DEVICE — SUT TICRON 1 30" HOS-10

## (undated) DEVICE — SUT BIOSYN 2-0 27" GS-11

## (undated) DEVICE — PACK TOTAL HIP

## (undated) DEVICE — DRILL BIT BIOMET 2.9MM

## (undated) DEVICE — VENODYNE/SCD SLEEVE CALF LARGE

## (undated) DEVICE — HOOD FLYTE STRYKER HELMET SHIELD

## (undated) DEVICE — NDL HYPO SAFE 18G X 1.5" (PINK)

## (undated) DEVICE — SOL INJ NS 0.9% 100ML

## (undated) DEVICE — GLV 8 PROTEXIS ORTHO (CREAM)

## (undated) DEVICE — SUCTION YANKAUER NO CONTROL VENT

## (undated) DEVICE — SUT NDL MAYO CATGUT 1/2 CIRCLE TAPER POINT 0.050" X 1.716"

## (undated) DEVICE — DRSG AQUACEL 3.5 X 10"

## (undated) DEVICE — VENODYNE/SCD SLEEVE CALF MEDIUM

## (undated) DEVICE — GLV 8 PROTEXIS (WHITE)

## (undated) DEVICE — SAW BLADE STRYKER SAGITTAL DUAL CUT 25X90X1.27MM

## (undated) DEVICE — SUT TICRON 1 30" HOS-11

## (undated) DEVICE — GOWN SMARTGOWN RAGLAN XLG

## (undated) DEVICE — GLV 8 PROTEXIS (CREAM) NEU-THERA

## (undated) DEVICE — DRSG DERMABOND MINI

## (undated) DEVICE — SUT POLYSORB 1 27" GS-12 UNDYED

## (undated) DEVICE — SYR LUER LOK 20CC

## (undated) DEVICE — PLV/PSP-ESU FORCEFX F8J7721A: Type: DURABLE MEDICAL EQUIPMENT

## (undated) DEVICE — NDL HYPO SAFE 22G X 1.5" (BLACK)

## (undated) DEVICE — WOUND IRR IRRISEPT W 0.5 CHG

## (undated) DEVICE — WARMING BLANKET UPPER ADULT

## (undated) DEVICE — HOOD T5 PEELAWAY

## (undated) DEVICE — SOL INJ NS 0.9% 500ML 2 PORT

## (undated) DEVICE — GLV 8.5 PROTEXIS (BLUE)

## (undated) DEVICE — SOL INJ NS 0.9% 500ML 1-PORT

## (undated) DEVICE — ELCTR BOVIE TIP BLADE VALLEYLAB 6.5"

## (undated) DEVICE — ELCTR AQUAMANTYS BIPOLAR SEALER 6.0

## (undated) DEVICE — ZIMMER PULSAVAC PLUS FAN KIT

## (undated) DEVICE — SYR LUER LOK 50CC

## (undated) DEVICE — PLV-SCD MACHINE: Type: DURABLE MEDICAL EQUIPMENT

## (undated) DEVICE — DRAPE 3/4 SHEET W REINFORCEMENT 56X77"

## (undated) DEVICE — PLV-STRYKER SYSTEM 8: Type: DURABLE MEDICAL EQUIPMENT